# Patient Record
Sex: MALE | Race: WHITE | NOT HISPANIC OR LATINO | Employment: OTHER | URBAN - METROPOLITAN AREA
[De-identification: names, ages, dates, MRNs, and addresses within clinical notes are randomized per-mention and may not be internally consistent; named-entity substitution may affect disease eponyms.]

---

## 2017-02-07 ENCOUNTER — OFFICE VISIT (OUTPATIENT)
Dept: MEDICAL GROUP | Facility: CLINIC | Age: 77
End: 2017-02-07
Payer: MEDICARE

## 2017-02-07 VITALS
RESPIRATION RATE: 18 BRPM | SYSTOLIC BLOOD PRESSURE: 126 MMHG | OXYGEN SATURATION: 96 % | HEART RATE: 71 BPM | WEIGHT: 149 LBS | TEMPERATURE: 99.3 F | BODY MASS INDEX: 24.83 KG/M2 | HEIGHT: 65 IN | DIASTOLIC BLOOD PRESSURE: 80 MMHG

## 2017-02-07 DIAGNOSIS — M54.41 CHRONIC MIDLINE LOW BACK PAIN WITH BILATERAL SCIATICA: ICD-10-CM

## 2017-02-07 DIAGNOSIS — G89.29 CHRONIC MIDLINE LOW BACK PAIN WITH BILATERAL SCIATICA: ICD-10-CM

## 2017-02-07 DIAGNOSIS — M54.42 CHRONIC MIDLINE LOW BACK PAIN WITH BILATERAL SCIATICA: ICD-10-CM

## 2017-02-07 DIAGNOSIS — M54.2 NECK PAIN: ICD-10-CM

## 2017-02-07 PROCEDURE — 4040F PNEUMOC VAC/ADMIN/RCVD: CPT | Performed by: INTERNAL MEDICINE

## 2017-02-07 PROCEDURE — 1101F PT FALLS ASSESS-DOCD LE1/YR: CPT | Performed by: INTERNAL MEDICINE

## 2017-02-07 PROCEDURE — G8420 CALC BMI NORM PARAMETERS: HCPCS | Performed by: INTERNAL MEDICINE

## 2017-02-07 PROCEDURE — G8432 DEP SCR NOT DOC, RNG: HCPCS | Performed by: INTERNAL MEDICINE

## 2017-02-07 PROCEDURE — 99214 OFFICE O/P EST MOD 30 MIN: CPT | Performed by: INTERNAL MEDICINE

## 2017-02-07 PROCEDURE — G8484 FLU IMMUNIZE NO ADMIN: HCPCS | Performed by: INTERNAL MEDICINE

## 2017-02-07 PROCEDURE — 1036F TOBACCO NON-USER: CPT | Performed by: INTERNAL MEDICINE

## 2017-02-07 RX ORDER — OXYCODONE AND ACETAMINOPHEN 10; 325 MG/1; MG/1
1 TABLET ORAL EVERY 6 HOURS PRN
Qty: 90 TAB | Refills: 0 | Status: SHIPPED | OUTPATIENT
Start: 2017-02-07 | End: 2017-02-07 | Stop reason: SDUPTHER

## 2017-02-07 RX ORDER — OXYCODONE AND ACETAMINOPHEN 10; 325 MG/1; MG/1
1 TABLET ORAL EVERY 6 HOURS PRN
Qty: 90 TAB | Refills: 0 | Status: SHIPPED | OUTPATIENT
Start: 2017-02-07 | End: 2017-03-07 | Stop reason: SDUPTHER

## 2017-02-07 ASSESSMENT — PATIENT HEALTH QUESTIONNAIRE - PHQ9: CLINICAL INTERPRETATION OF PHQ2 SCORE: 0

## 2017-02-07 NOTE — MR AVS SNAPSHOT
"Brian Mcduffie   2017 2:00 PM   Office Visit   MRN: [id number]    Department:  [facility]   Dept Phone:  [phone]    Description:  Male : 1940   Provider:  Angel Meraz D.O.           Reason for Visit     Follow-Up 3 months      Allergies as of 2017     Allergen Noted Reactions    Nkda [No Known Drug Allergy] 2008         You were diagnosed with     Chronic midline low back pain with bilateral sciatica   [0384512]       Neck pain   [273059]         Vital Signs     Blood Pressure Pulse Temperature Respirations Height Weight    126/80 mmHg 71 37.4 °C (99.3 °F) 18 1.651 m (5' 5\") 67.586 kg (149 lb)    Body Mass Index Oxygen Saturation Smoking Status             24.79 kg/m2 96% Former Smoker         Basic Information     Date Of Birth Sex Race Ethnicity Preferred Language    1940 Male White Non- English      Problem List              ICD-10-CM Priority Class Noted - Resolved    Neck pain M54.2   5/15/2009 - Present    Gouty arthritis M10.9   2009 - Present    Renal insufficiency N28.9   2009 - Present    Dyslipidemia E78.5   2009 - Present    CHF (congestive heart failure) (CMS-HCC) I50.9   2009 - Present    HYPOTENSION    2009 - Present    HTN (hypertension) I10   2009 - Present    Nocturia R35.1   2009 - Present    Skin texture changes R23.4   2009 - Present    Nocturia R35.1   2010 - Present    Elevated PSA R97.20   7/3/2012 - Present    Inguinal hernia K40.90   10/16/2012 - Present    Colon polyp K63.5   2013 - Present    Aortic calcification (CMS-HCC) I70.0   2013 - Present      Health Maintenance        Date Due Completion Dates    IMM ZOSTER VACCINE 1999 ---    IMM INFLUENZA (1) 2016 10/26/2015, 2014, 2013, 2012, 2010, 2009    COLONOSCOPY 2023    IMM DTaP/Tdap/Td Vaccine (2 - Td) 2023            Current Immunizations     13-VALENT PCV " PREVNAR 7/28/2016    Influenza TIV (IM) 12/16/2013, 11/5/2012, 12/9/2009    Influenza Vaccine Adult HD 10/26/2015    Influenza Vaccine Pediatric 12/6/2010    Influenza Vaccine Quad Inj (Preserved) 12/26/2014    Pneumococcal polysaccharide vaccine (PPSV-23) 12/16/2013    Tdap Vaccine 5/9/2013      Below and/or attached are the medications your provider expects you to take. Review all of your home medications and newly ordered medications with your provider and/or pharmacist. Follow medication instructions as directed by your provider and/or pharmacist. Please keep your medication list with you and share with your provider. Update the information when medications are discontinued, doses are changed, or new medications (including over-the-counter products) are added; and carry medication information at all times in the event of emergency situations     Allergies:  NKDA - (reactions not documented)               Medications  Valid as of: February 07, 2017 -  2:32 PM    Generic Name Brand Name Tablet Size Instructions for use    Allopurinol (Tab) ZYLOPRIM 300 MG TAKE ONE TABLET BY MOUTH DAILY        AmLODIPine Besylate (Tab) NORVASC 2.5 MG TAKE ONE TABLET BY MOUTH DAILY        Hydrocodone-Acetaminophen (Tab) NORCO 5-325 MG Take 1 Tab by mouth every four hours as needed.        Lisinopril-Hydrochlorothiazide (Tab) PRINZIDE, ZESTORETIC 20-12.5 MG TAKE ONE TABLET BY MOUTH DAILY        Lovastatin (Tab) MEVACOR 40 MG TAKE ONE TABLET BY MOUTH DAILY        Ondansetron HCl (Tab) ZOFRAN 4 MG Take 1 Tab by mouth every 8 hours as needed.        Oxycodone-Acetaminophen (Tab) PERCOCET-10  MG Take 1 Tab by mouth every 6 hours as needed.        Tamsulosin HCl (Cap) FLOMAX 0.4 MG Take 1 Cap by mouth ONE-HALF HOUR AFTER BREAKFAST.        Tamsulosin HCl (Cap) FLOMAX 0.4 MG TAKE TWO CAPSULES BY MOUTH DAILY 1/2 AN HOUR AFTER BREAKFAST        .                 Medicines prescribed today were sent to:     [facility] #[id number] -  [place], [place] - [address]    [address] [place] [address]    Phone: [phone] Fax: [phone]    Open 24 Hours?: No      Medication refill instructions:       If your prescription bottle indicates you have medication refills left, it is not necessary to call your provider’s office. Please contact your pharmacy and they will refill your medication.    If your prescription bottle indicates you do not have any refills left, you may request refills at any time through one of the following ways: The online [employer] [address] system (except Urgent Care), by calling your provider’s office, or by asking your pharmacy to contact your provider’s office with a refill request. Medication refills are processed only during regular business hours and may not be available until the next business day. Your provider may request additional information or to have a follow-up visit with you prior to refilling your medication.   *Please Note: Medication refills are assigned a new Rx number when refilled electronically. Your pharmacy may indicate that no refills were authorized even though a new prescription for the same medication is available at the pharmacy. Please request the medicine by name with the pharmacy before contacting your provider for a refill.        Your To Do List     Future Labs/Procedures Complete By Expires    DX-LUMBAR SPINE-2 OR 3 VIEWS  As directed 2/7/2018    DX-LUMBAR SPINE-BEND OR FLEX-EXT  As directed 2/7/2018    MR-LUMBAR SPINE-W/O  As directed 8/10/2017      Referral     A referral request has been sent to our patient care coordination department. Please allow 3-5 business days for us to process this request and contact you either by phone or mail. If you do not hear from us by the 5th business day, please call us at [phone].           [address] Status: Patient Declined

## 2017-02-07 NOTE — PROGRESS NOTES
CC: Brian Mcduffie is a 76 y.o. male is suffering from   Chief Complaint   Patient presents with   • Follow-Up     3 months         SUBJECTIVE:  1. Chronic midline low back pain with bilateral sciatica  Koroma here for follow-up, as undergone evaluation by anesthesiology's had an epidural steroid without any significant improvement in his symptoms. Patient is struggled with his back pain states he's now having difficulty with ambulating more than 100 yards, states that he will not engage in physical activity because of the pain, cannot ride an exercise bike.  We have discussed that his disease status progressed the point where he does need surgical intervention. Patient's repeat x-rays also MRI of lumbar spine.     2. Neck pain  Patient with ongoing neck pain discomfort        Past social, family, history:   Social History   Substance Use Topics   • Smoking status: Former Smoker     Quit date: 10/03/1969   • Smokeless tobacco: Never Used   • Alcohol Use: Yes      Comment: 2 a month         MEDICATIONS:    Current outpatient prescriptions:   •  oxycodone-acetaminophen (PERCOCET-10)  MG Tab, Take 1 Tab by mouth every 6 hours as needed., Disp: 90 Tab, Rfl: 0  •  lovastatin (MEVACOR) 40 MG tablet, TAKE ONE TABLET BY MOUTH DAILY, Disp: 90 Tab, Rfl: 3  •  tamsulosin (FLOMAX) 0.4 MG capsule, Take 1 Cap by mouth ONE-HALF HOUR AFTER BREAKFAST., Disp: 90 Cap, Rfl: 3  •  lisinopril-hydrochlorothiazide (PRINZIDE, ZESTORETIC) 20-12.5 MG per tablet, TAKE ONE TABLET BY MOUTH DAILY, Disp: 90 Tab, Rfl: 3  •  amlodipine (NORVASC) 2.5 MG Tab, TAKE ONE TABLET BY MOUTH DAILY, Disp: 90 Tab, Rfl: 3  •  allopurinol (ZYLOPRIM) 300 MG Tab, TAKE ONE TABLET BY MOUTH DAILY, Disp: 90 Tab, Rfl: 3  •  tamsulosin (FLOMAX) 0.4 MG capsule, TAKE TWO CAPSULES BY MOUTH DAILY 1/2 AN HOUR AFTER BREAKFAST, Disp: 180 Cap, Rfl: 2  •  hydrocodone-acetaminophen (NORCO) 5-325 MG TABS per tablet, Take 1 Tab by mouth every four hours as  "needed., Disp: 8 Tab, Rfl: 0  •  ondansetron (ZOFRAN) 4 MG TABS tablet, Take 1 Tab by mouth every 8 hours as needed., Disp: 5 Tab, Rfl: 1    PROBLEMS:  Patient Active Problem List    Diagnosis Date Noted   • Aortic calcification (CMS-HCC) 05/24/2013   • Colon polyp 02/19/2013   • Inguinal hernia 10/16/2012   • Elevated PSA 07/03/2012   • Nocturia 12/06/2010   • Skin texture changes 12/16/2009   • Nocturia 09/09/2009   • HTN (hypertension) 06/05/2009   • Gouty arthritis 05/22/2009   • Renal insufficiency 05/22/2009   • Dyslipidemia 05/22/2009   • CHF (congestive heart failure) (CMS-HCC) 05/22/2009   • HYPOTENSION 05/22/2009   • Neck pain 05/15/2009       REVIEW OF SYSTEMS:  Gen.:  No Nausea, Vomiting, fever, Chills.  Heart: No chest pain.  Lungs:  No shortness of Breath.  Psychological: Tony unusual Anxiety depression     PHYSICAL EXAM   Constitutional: Alert, cooperative, not in acute distress.  Cardiovascular:  Rate Rhythm is regular without murmurs rubs clicks.     Thorax & Lungs: Clear to auscultation, no wheezing, rhonchi, or rales  HENT: Normocephalic, Atraumatic.  Eyes: PERRLA, EOMI, Conjunctiva normal.   Neck: Trachia is midline no swelling of the thyroid.   Neurologic: Alert & oriented x 3, cranial nerves II through XII are intact, Normal motor function, Normal sensory function, No focal deficits noted.   Psychiatric: Affect normal, Judgment normal, Mood normal.     VITAL SIGNS:/80 mmHg  Pulse 71  Temp(Src) 37.4 °C (99.3 °F)  Resp 18  Ht 1.651 m (5' 5\")  Wt 67.586 kg (149 lb)  BMI 24.79 kg/m2  SpO2 96%    Labs: Reviewed    Assessment:                                                     Plan:    1. Chronic midline low back pain with bilateral sciatica  X-ray MRI lumbar spine referral to neurosurgery for evaluation, patient is failed outpatient epidural steroids  - MR-LUMBAR SPINE-W/O; Future  - REFERRAL TO NEUROSURGERY  - DX-LUMBAR SPINE-BEND OR FLEX-EXT; Future  - DX-LUMBAR SPINE-2 OR 3 VIEWS; " Future    2. Neck pain  Continue Percocet  - oxycodone-acetaminophen (PERCOCET-10)  MG Tab; Take 1 Tab by mouth every 6 hours as needed.  Dispense: 90 Tab; Refill: 0

## 2017-02-14 PROBLEM — Z79.891 CHRONIC USE OF OPIATE DRUGS THERAPEUTIC PURPOSES: Status: ACTIVE | Noted: 2017-02-14

## 2017-02-27 ENCOUNTER — HOSPITAL ENCOUNTER (OUTPATIENT)
Dept: RADIOLOGY | Facility: MEDICAL CENTER | Age: 77
End: 2017-02-27
Attending: INTERNAL MEDICINE
Payer: MEDICARE

## 2017-02-27 DIAGNOSIS — K62.89 CHRONIC IDIOPATHIC ANAL PAIN: ICD-10-CM

## 2017-02-27 DIAGNOSIS — M54.42 LEFT-SIDED LOW BACK PAIN WITH LEFT-SIDED SCIATICA, UNSPECIFIED CHRONICITY: ICD-10-CM

## 2017-02-27 DIAGNOSIS — G89.29 CHRONIC IDIOPATHIC ANAL PAIN: ICD-10-CM

## 2017-02-27 DIAGNOSIS — M54.41 LEFT-SIDED LOW BACK PAIN WITH RIGHT-SIDED SCIATICA, UNSPECIFIED CHRONICITY: ICD-10-CM

## 2017-02-27 PROCEDURE — 72110 X-RAY EXAM L-2 SPINE 4/>VWS: CPT

## 2017-03-07 ENCOUNTER — OFFICE VISIT (OUTPATIENT)
Dept: MEDICAL GROUP | Facility: CLINIC | Age: 77
End: 2017-03-07
Payer: MEDICARE

## 2017-03-07 VITALS
BODY MASS INDEX: 24.66 KG/M2 | HEART RATE: 76 BPM | DIASTOLIC BLOOD PRESSURE: 82 MMHG | RESPIRATION RATE: 16 BRPM | HEIGHT: 65 IN | OXYGEN SATURATION: 96 % | TEMPERATURE: 97.5 F | SYSTOLIC BLOOD PRESSURE: 120 MMHG | WEIGHT: 148 LBS

## 2017-03-07 DIAGNOSIS — Z79.899 CHRONIC USE OF BENZODIAZEPINE FOR THERAPEUTIC PURPOSE: ICD-10-CM

## 2017-03-07 DIAGNOSIS — M54.5 LOW BACK PAIN, UNSPECIFIED BACK PAIN LATERALITY, UNSPECIFIED CHRONICITY, WITH SCIATICA PRESENCE UNSPECIFIED: ICD-10-CM

## 2017-03-07 DIAGNOSIS — Z79.891 CHRONIC USE OF OPIATE DRUGS THERAPEUTIC PURPOSES: ICD-10-CM

## 2017-03-07 PROCEDURE — G8432 DEP SCR NOT DOC, RNG: HCPCS | Performed by: INTERNAL MEDICINE

## 2017-03-07 PROCEDURE — G8420 CALC BMI NORM PARAMETERS: HCPCS | Performed by: INTERNAL MEDICINE

## 2017-03-07 PROCEDURE — 4040F PNEUMOC VAC/ADMIN/RCVD: CPT | Performed by: INTERNAL MEDICINE

## 2017-03-07 PROCEDURE — 1036F TOBACCO NON-USER: CPT | Performed by: INTERNAL MEDICINE

## 2017-03-07 PROCEDURE — 99213 OFFICE O/P EST LOW 20 MIN: CPT | Performed by: INTERNAL MEDICINE

## 2017-03-07 PROCEDURE — G8482 FLU IMMUNIZE ORDER/ADMIN: HCPCS | Performed by: INTERNAL MEDICINE

## 2017-03-07 PROCEDURE — 1101F PT FALLS ASSESS-DOCD LE1/YR: CPT | Performed by: INTERNAL MEDICINE

## 2017-03-07 RX ORDER — OXYCODONE AND ACETAMINOPHEN 10; 325 MG/1; MG/1
1 TABLET ORAL EVERY 6 HOURS PRN
Qty: 90 TAB | Refills: 0 | Status: SHIPPED | OUTPATIENT
Start: 2017-03-07 | End: 2017-03-07 | Stop reason: SDUPTHER

## 2017-03-07 RX ORDER — OXYCODONE AND ACETAMINOPHEN 10; 325 MG/1; MG/1
1 TABLET ORAL EVERY 6 HOURS PRN
Qty: 90 TAB | Refills: 0 | Status: SHIPPED | OUTPATIENT
Start: 2017-03-07 | End: 2017-06-07 | Stop reason: SDUPTHER

## 2017-03-07 ASSESSMENT — LIFESTYLE VARIABLES: HISTORY_ALCOHOL_USE: 0

## 2017-03-07 ASSESSMENT — ENCOUNTER SYMPTOMS: DEPRESSION: 0

## 2017-03-07 ASSESSMENT — PAIN SCALES - GENERAL: PAINLEVEL: 4=SLIGHT-MODERATE PAIN

## 2017-03-07 NOTE — PROGRESS NOTES
CC: Brian Mcduffie is a 76 y.o. male is suffering from   Chief Complaint   Patient presents with   • Back Pain     follow up         SUBJECTIVE:  1. Low back pain, unspecified back pain laterality, unspecified chronicity, with sciatica presence unspecified  Patient's here for follow-up has a history of back pain. We've reviewed his previous MRI of his lumbar spine which shows that he does have central canal stenosis.     2. Chronic use of opiate drugs therapeutic purposes  Patient's using chronic opioids for this issue.     3. Chronic use of benzodiazepine for therapeutic purpose  Stable      Chronic pain recheck:   Last dose of controlled substance: Today  Chronic pain treated with Norco taken 2-3 times a day    He  reports that he drinks alcohol.  He  reports that he does not use illicit drugs.    Consequences of Chronic Opiate therapy:  (5 A's)  Analgesia: Compared to no treatment or prior treatment, pain is currently improved  Activity: improved  Adverse Events: He denies constipation, dry mouth, itchy skin, nausea, sedation and none  Aberrant Behaviors: He reports he is taking medication as prescribed and is not veering from agreed treatment regimen. There have been no inappropriate refills or lost/stolen meds reported.   Affect/Mood: Pain is impacting patient's mood.  Patient denies depression/anxiety.    Nonnarcotic treatments that are being used: none.   Treatment goals discussed.    Last order of CONTROLLED SUBSTANCE TREATMENT AGREEMENT was found on 3/7/2017 from Office Visit on 3/7/2017     UDS Summary                URINE DRUG SCREEN Next Due 7/23/2017      Done 7/28/2016 PAIN MANAGEMENT PANEL, SCRN W/ RFLX TO QNT        Most recent UDS reviewed today and is consistent with prescribed medications.   Opioid Risk Score: 0    Interpretation of Opioid Risk Score   Score 0-3 = Low risk of abuse. Do UDS at least once per year.  Score 4-7 = Moderate risk of abuse. Do UDS 1-4 times per year.  Score 8+ =  High risk of abuse. Refer to specialist.      I have reviewed the medical records, the Prescription Monitoring Program and I have determined that controlled substance treatment is medically indicated.      Past social, family, history:   Social History   Substance Use Topics   • Smoking status: Former Smoker     Quit date: 10/03/1969   • Smokeless tobacco: Never Used   • Alcohol Use: Yes      Comment: 2 a month         MEDICATIONS:    Current outpatient prescriptions:   •  oxycodone-acetaminophen (PERCOCET-10)  MG Tab, Take 1 Tab by mouth every 6 hours as needed., Disp: 90 Tab, Rfl: 0  •  tamsulosin (FLOMAX) 0.4 MG capsule, TAKE TWO CAPSULES BY MOUTH DAILY 1/2 AN HOUR AFTER BREAKFAST, Disp: 180 Cap, Rfl: 2  •  lovastatin (MEVACOR) 40 MG tablet, TAKE ONE TABLET BY MOUTH DAILY, Disp: 90 Tab, Rfl: 3  •  lisinopril-hydrochlorothiazide (PRINZIDE, ZESTORETIC) 20-12.5 MG per tablet, TAKE ONE TABLET BY MOUTH DAILY, Disp: 90 Tab, Rfl: 3  •  allopurinol (ZYLOPRIM) 300 MG Tab, TAKE ONE TABLET BY MOUTH DAILY, Disp: 90 Tab, Rfl: 3  •  tamsulosin (FLOMAX) 0.4 MG capsule, Take 1 Cap by mouth ONE-HALF HOUR AFTER BREAKFAST., Disp: 90 Cap, Rfl: 3  •  amlodipine (NORVASC) 2.5 MG Tab, TAKE ONE TABLET BY MOUTH DAILY, Disp: 90 Tab, Rfl: 3  •  hydrocodone-acetaminophen (NORCO) 5-325 MG TABS per tablet, Take 1 Tab by mouth every four hours as needed., Disp: 8 Tab, Rfl: 0  •  ondansetron (ZOFRAN) 4 MG TABS tablet, Take 1 Tab by mouth every 8 hours as needed., Disp: 5 Tab, Rfl: 1    PROBLEMS:  Patient Active Problem List    Diagnosis Date Noted   • Chronic use of opiate drugs therapeutic purposes 02/14/2017   • Aortic calcification (CMS-HCC) 05/24/2013   • Colon polyp 02/19/2013   • Inguinal hernia 10/16/2012   • Elevated PSA 07/03/2012   • Nocturia 12/06/2010   • Skin texture changes 12/16/2009   • Nocturia 09/09/2009   • HTN (hypertension) 06/05/2009   • Gouty arthritis 05/22/2009   • Renal insufficiency 05/22/2009   • Dyslipidemia  "05/22/2009   • CHF (congestive heart failure) (CMS-[facility]) 05/22/2009   • HYPOTENSION 05/22/2009   • Neck pain 05/15/2009       REVIEW OF SYSTEMS:  Gen.:  No Nausea, Vomiting, fever, Chills.  Heart: No chest pain.  Lungs:  No shortness of Breath.  Psychological: Tony unusual Anxiety depression     PHYSICAL EXAM   Constitutional: Alert, cooperative, not in acute distress.  Cardiovascular:  Rate Rhythm is regular without murmurs rubs clicks.     Thorax & Lungs: Clear to auscultation, no wheezing, rhonchi, or rales  HENT: Normocephalic, Atraumatic.  Eyes: PERRLA, EOMI, Conjunctiva normal.   Neck: Trachia is midline no swelling of the thyroid.   Neurologic: Alert & oriented x 3, cranial nerves II through XII are intact, Normal motor function, Normal sensory function, No focal deficits noted.   Psychiatric: Affect normal, Judgment normal, Mood normal.     VITAL SIGNS:/82 mmHg  Pulse 76  Temp(Src) 36.4 °C (97.5 °F)  Resp 16  Ht 1.651 m (5' 5\")  Wt 67.132 kg (148 lb)  BMI 24.63 kg/m2  SpO2 96%    Labs: Reviewed    Assessment:                                                     Plan:    1. Low back pain, unspecified back pain laterality, unspecified chronicity, with sciatica presence unspecified  Patient's MRI of lumbar spine was reviewed with the patient shows significant stenosis  - oxycodone-acetaminophen (PERCOCET-10)  MG Tab; Take 1 Tab by mouth every 6 hours as needed.  Dispense: 90 Tab; Refill: 0    2. Chronic use of opiate drugs therapeutic purposes  Stable  - Controlled Substance Treatment Agreement      "

## 2017-03-07 NOTE — MR AVS SNAPSHOT
"        Brian Mcduffie   3/7/2017 2:00 PM   Office Visit   MRN: [id number]    Department:  [facility]   Dept Phone:  [phone]    Description:  Male : 1940   Provider:  Angel Meraz D.O.           Reason for Visit     Back Pain follow up      Allergies as of 3/7/2017     Allergen Noted Reactions    Nkda [No Known Drug Allergy] 2008         You were diagnosed with     Neck pain   [408805]       Chronic use of opiate drugs therapeutic purposes   [5262047]       Chronic use of benzodiazepine for therapeutic purpose   [1935341]         Vital Signs     Blood Pressure Pulse Temperature Respirations Height Weight    120/82 mmHg 76 36.4 °C (97.5 °F) 16 1.651 m (5' 5\") 67.132 kg (148 lb)    Body Mass Index Oxygen Saturation Smoking Status             24.63 kg/m2 96% Former Smoker         Basic Information     Date Of Birth Sex Race Ethnicity Preferred Language    1940 Male White Non- English      Your appointments     Mar 14, 2017  8:00 AM   MR SP WO 30 with [facility]   [facility]    [address] [address]   [phone]              Problem List              ICD-10-CM Priority Class Noted - Resolved    Neck pain M54.2   5/15/2009 - Present    Gouty arthritis M10.9   2009 - Present    Renal insufficiency N28.9   2009 - Present    Dyslipidemia E78.5   2009 - Present    CHF (congestive heart failure) (CMS-HCC) I50.9   2009 - Present    HYPOTENSION    2009 - Present    HTN (hypertension) I10   2009 - Present    Nocturia R35.1   2009 - Present    Skin texture changes R23.4   2009 - Present    Nocturia R35.1   2010 - Present    Elevated PSA R97.20   7/3/2012 - Present    Inguinal hernia K40.90   10/16/2012 - Present    Colon polyp K63.5   2013 - Present    Aortic calcification (CMS-HCC) I70.0   2013 - Present    Chronic use of opiate drugs therapeutic purposes Z79.899   2017 - Present      Health " Maintenance        Date Due Completion Dates    IMM ZOSTER VACCINE 8/17/1999 ---    COLONOSCOPY 2/7/2023 2/7/2013    IMM DTaP/Tdap/Td Vaccine (2 - Td) 5/9/2023 5/9/2013            Current Immunizations     13-VALENT PCV PREVNAR 7/28/2016    Influenza TIV (IM) 11/30/2016, 12/16/2013, 11/5/2012, 12/9/2009    Influenza Vaccine Adult HD 10/26/2015    Influenza Vaccine Pediatric 12/6/2010    Influenza Vaccine Quad Inj (Preserved) 12/26/2014    Pneumococcal polysaccharide vaccine (PPSV-23) 12/16/2013    Tdap Vaccine 5/9/2013      Below and/or attached are the medications your provider expects you to take. Review all of your home medications and newly ordered medications with your provider and/or pharmacist. Follow medication instructions as directed by your provider and/or pharmacist. Please keep your medication list with you and share with your provider. Update the information when medications are discontinued, doses are changed, or new medications (including over-the-counter products) are added; and carry medication information at all times in the event of emergency situations     Allergies:  NKDA - (reactions not documented)               Medications  Valid as of: March 07, 2017 -  2:29 PM    Generic Name Brand Name Tablet Size Instructions for use    Allopurinol (Tab) ZYLOPRIM 300 MG TAKE ONE TABLET BY MOUTH DAILY        AmLODIPine Besylate (Tab) NORVASC 2.5 MG TAKE ONE TABLET BY MOUTH DAILY        Hydrocodone-Acetaminophen (Tab) NORCO 5-325 MG Take 1 Tab by mouth every four hours as needed.        Lisinopril-Hydrochlorothiazide (Tab) PRINZIDE, ZESTORETIC 20-12.5 MG TAKE ONE TABLET BY MOUTH DAILY        Lovastatin (Tab) MEVACOR 40 MG TAKE ONE TABLET BY MOUTH DAILY        Ondansetron HCl (Tab) ZOFRAN 4 MG Take 1 Tab by mouth every 8 hours as needed.        Oxycodone-Acetaminophen (Tab) PERCOCET-10  MG Take 1 Tab by mouth every 6 hours as needed.        Tamsulosin HCl (Cap) FLOMAX 0.4 MG Take 1 Cap by mouth  ONE-HALF HOUR AFTER BREAKFAST.        Tamsulosin HCl (Cap) FLOMAX 0.4 MG TAKE TWO CAPSULES BY MOUTH DAILY 1/2 AN HOUR AFTER BREAKFAST        .                 Medicines prescribed today were sent to:     [facility] #[id number] - [address] AT [address] [address]    Phone: [phone] Fax: [phone]    Open 24 Hours?: No      Medication refill instructions:       If your prescription bottle indicates you have medication refills left, it is not necessary to call your provider’s office. Please contact your pharmacy and they will refill your medication.    If your prescription bottle indicates you do not have any refills left, you may request refills at any time through one of the following ways: The online [employer] system (except Urgent Care), by calling your provider’s office, or by asking your pharmacy to contact your provider’s office with a refill request. Medication refills are processed only during regular business hours and may not be available until the next business day. Your provider may request additional information or to have a follow-up visit with you prior to refilling your medication.   *Please Note: Medication refills are assigned a new Rx number when refilled electronically. Your pharmacy may indicate that no refills were authorized even though a new prescription for the same medication is available at the pharmacy. Please request the medicine by name with the pharmacy before contacting your provider for a refill.           [facility] Status: Patient Declined

## 2017-03-14 ENCOUNTER — HOSPITAL ENCOUNTER (OUTPATIENT)
Dept: RADIOLOGY | Facility: MEDICAL CENTER | Age: 77
End: 2017-03-14
Attending: INTERNAL MEDICINE
Payer: MEDICARE

## 2017-03-14 DIAGNOSIS — M54.41 CHRONIC MIDLINE LOW BACK PAIN WITH BILATERAL SCIATICA: ICD-10-CM

## 2017-03-14 DIAGNOSIS — M54.42 CHRONIC MIDLINE LOW BACK PAIN WITH BILATERAL SCIATICA: ICD-10-CM

## 2017-03-14 DIAGNOSIS — G89.29 CHRONIC MIDLINE LOW BACK PAIN WITH BILATERAL SCIATICA: ICD-10-CM

## 2017-03-14 PROCEDURE — 72148 MRI LUMBAR SPINE W/O DYE: CPT

## 2017-05-08 ENCOUNTER — OFFICE VISIT (OUTPATIENT)
Dept: MEDICAL GROUP | Facility: CLINIC | Age: 77
End: 2017-05-08
Payer: MEDICARE

## 2017-05-08 VITALS
TEMPERATURE: 97 F | WEIGHT: 149 LBS | RESPIRATION RATE: 16 BRPM | HEART RATE: 88 BPM | DIASTOLIC BLOOD PRESSURE: 80 MMHG | OXYGEN SATURATION: 97 % | SYSTOLIC BLOOD PRESSURE: 140 MMHG | BODY MASS INDEX: 24.83 KG/M2 | HEIGHT: 65 IN

## 2017-05-08 DIAGNOSIS — H10.33 ACUTE BACTERIAL CONJUNCTIVITIS OF BOTH EYES: ICD-10-CM

## 2017-05-08 PROCEDURE — 99213 OFFICE O/P EST LOW 20 MIN: CPT | Performed by: NURSE PRACTITIONER

## 2017-05-08 PROCEDURE — 4040F PNEUMOC VAC/ADMIN/RCVD: CPT | Performed by: NURSE PRACTITIONER

## 2017-05-08 PROCEDURE — G8420 CALC BMI NORM PARAMETERS: HCPCS | Performed by: NURSE PRACTITIONER

## 2017-05-08 PROCEDURE — 1101F PT FALLS ASSESS-DOCD LE1/YR: CPT | Performed by: NURSE PRACTITIONER

## 2017-05-08 PROCEDURE — 1036F TOBACCO NON-USER: CPT | Performed by: NURSE PRACTITIONER

## 2017-05-08 PROCEDURE — G8432 DEP SCR NOT DOC, RNG: HCPCS | Performed by: NURSE PRACTITIONER

## 2017-05-08 RX ORDER — POLYMYXIN B SULFATE AND TRIMETHOPRIM 1; 10000 MG/ML; [USP'U]/ML
1 SOLUTION OPHTHALMIC EVERY 4 HOURS
Qty: 10 ML | Refills: 0 | Status: SHIPPED | OUTPATIENT
Start: 2017-05-08 | End: 2017-05-13

## 2017-05-08 ASSESSMENT — ENCOUNTER SYMPTOMS
CHILLS: 0
COUGH: 0
SORE THROAT: 0
EYE PAIN: 0
FEVER: 0
EYE REDNESS: 1
EYE DISCHARGE: 1

## 2017-05-08 NOTE — PATIENT INSTRUCTIONS
Bacterial Conjunctivitis  Bacterial conjunctivitis (commonly called pink eye) is redness, soreness, or puffiness (inflammation) of the white part of your eye. It is caused by a germ called bacteria. These germs can easily spread from person to person (contagious). Your eye often will become red or pink. Your eye may also become irritated, watery, or have a thick discharge.   HOME CARE   · Apply a cool, clean washcloth over closed eyelids. Do this for 10-20 minutes, 3-4 times a day while you have pain.  · Gently wipe away any fluid coming from the eye with a warm, wet washcloth or cotton ball.  · Wash your hands often with soap and water. Use paper towels to dry your hands.  · Do not share towels or washcloths.  · Change or wash your pillowcase every day.  · Do not use eye makeup until the infection is gone.  · Do not use machines or drive if your vision is blurry.  · Stop using contact lenses. Do not use them again until your doctor says it is okay.  · Do not touch the tip of the eye drop bottle or medicine tube with your fingers when you put medicine on the eye.  GET HELP RIGHT AWAY IF:   · Your eye is not better after 3 days of starting your medicine.  · You have a yellowish fluid coming out of the eye.  · You have more pain in the eye.  · Your eye redness is spreading.  · Your vision becomes blurry.  · You have a fever or lasting symptoms for more than 2-3 days.  · You have a fever and your symptoms suddenly get worse.  · You have pain in the face.  · Your face gets red or puffy (swollen).  MAKE SURE YOU:   · Understand these instructions.  · Will watch this condition.  · Will get help right away if you are not doing well or get worse.     This information is not intended to replace advice given to you by your health care provider. Make sure you discuss any questions you have with your health care provider.     Document Released: 09/26/2009 Document Revised: 12/04/2013 Document Reviewed: 08/23/2013  [employer] Patient Education ©2016 [employer]

## 2017-05-08 NOTE — PROGRESS NOTES
Chief Complaint   Patient presents with   • Eye Problem     redness/ itchiness/       HISTORY OF PRESENT ILLNESS: Patient is a 76 y.o. male established patient who presents today due to 2 days hx of red eye, itchiness and also yellowish discharge. Pt denied any fever or chills. No vision change. No recent URI. Pt wears glasses not contact lens. No recent hospitalization. He uses warm towel compression but not helping and symptoms have gone worse today. He called her daughter who recommended pt to be seen immediately.       Patient Active Problem List    Diagnosis Date Noted   • Chronic use of opiate drugs therapeutic purposes 02/14/2017   • Aortic calcification (CMS-HCC) 05/24/2013   • Colon polyp 02/19/2013   • Inguinal hernia 10/16/2012   • Elevated PSA 07/03/2012   • Nocturia 12/06/2010   • Skin texture changes 12/16/2009   • Nocturia 09/09/2009   • HTN (hypertension) 06/05/2009   • Gouty arthritis 05/22/2009   • Renal insufficiency 05/22/2009   • Dyslipidemia 05/22/2009   • CHF (congestive heart failure) (CMS-HCC) 05/22/2009   • HYPOTENSION 05/22/2009   • Neck pain 05/15/2009       Allergies:Nkda    Current Outpatient Prescriptions   Medication Sig Dispense Refill   • oxycodone-acetaminophen (PERCOCET-10)  MG Tab Take 1 Tab by mouth every 6 hours as needed. 90 Tab 0   • tamsulosin (FLOMAX) 0.4 MG capsule TAKE TWO CAPSULES BY MOUTH DAILY 1/2 AN HOUR AFTER BREAKFAST 180 Cap 2   • lovastatin (MEVACOR) 40 MG tablet TAKE ONE TABLET BY MOUTH DAILY 90 Tab 3   • amlodipine (NORVASC) 2.5 MG Tab TAKE ONE TABLET BY MOUTH DAILY 90 Tab 3   • allopurinol (ZYLOPRIM) 300 MG Tab TAKE ONE TABLET BY MOUTH DAILY 90 Tab 3   • tamsulosin (FLOMAX) 0.4 MG capsule Take 1 Cap by mouth ONE-HALF HOUR AFTER BREAKFAST. 90 Cap 3   • lisinopril-hydrochlorothiazide (PRINZIDE, ZESTORETIC) 20-12.5 MG per tablet TAKE ONE TABLET BY MOUTH DAILY 90 Tab 3   • hydrocodone-acetaminophen (NORCO) 5-325 MG TABS per tablet Take 1 Tab by mouth every  "four hours as needed. 8 Tab 0   • ondansetron (ZOFRAN) 4 MG TABS tablet Take 1 Tab by mouth every 8 hours as needed. 5 Tab 1     No current facility-administered medications for this visit.       Social History   Substance Use Topics   • Smoking status: Former Smoker     Quit date: 10/03/1969   • Smokeless tobacco: Never Used   • Alcohol Use: Yes      Comment: 2 a month       No family status information on file.   No family history on file.      ROS:  Review of Systems   Constitutional: Negative for fever and chills.   HENT: Negative for congestion, ear pain and sore throat.    Eyes: Positive for discharge and redness. Negative for pain.   Respiratory: Negative for cough.    Skin: Positive for itching (to his both eyes).        Objective:     Blood pressure 140/80, pulse 88, temperature 36.1 °C (97 °F), resp. rate 16, height 1.651 m (5' 5\"), weight 67.586 kg (149 lb), SpO2 97 %.     Physical Exam:  Physical Exam   Constitutional: He is oriented to person, place, and time and well-developed, well-nourished, and in no distress.   HENT:   Head: Normocephalic and atraumatic.   Eyes: Right eye exhibits discharge ( yellow). Left eye exhibits discharge. Right conjunctiva is injected. Left conjunctiva is injected.   Neck: Neck supple. No JVD present.   Cardiovascular: Normal rate.    Pulmonary/Chest: Effort normal.   Musculoskeletal: Normal range of motion. He exhibits no edema.   Neurological: He is alert and oriented to person, place, and time.   Skin: Skin is warm.   Vitals reviewed.        Assessment/Plan:  1. Acute bacterial conjunctivitis of both eyes  - polymixin-trimethoprim (POLYTRIM) 81985-3.1 UNIT/ML-% Solution; Place 1 Drop in both eyes every 4 hours for 5 days.  Dispense: 10 mL; Refill: 0  - Reading material of conjunctivitis was given to pt. Pt is instructed to call if worsening symptoms even after eye drop. Pt verbalized understanding.       "

## 2017-05-08 NOTE — MR AVS SNAPSHOT
"Brian Mcduffie   2017 3:20 PM   Office Visit   MRN: [id number]    Department:  [facility]   Dept Phone:  [phone]    Description:  Male : 1940   Provider:  MUSTAPHA Rob           Reason for Visit     Eye Problem redness/ itchiness/      Allergies as of 2017     Allergen Noted Reactions    Nkda [No Known Drug Allergy] 2008         You were diagnosed with     Acute bacterial conjunctivitis of both eyes   [[id number]]         Vital Signs     Blood Pressure Pulse Temperature Respirations Height Weight    140/80 mmHg 88 36.1 °C (97 °F) 16 1.651 m (5' 5\") 67.586 kg (149 lb)    Body Mass Index Oxygen Saturation Smoking Status             24.79 kg/m2 97% Former Smoker         Basic Information     Date Of Birth Sex Race Ethnicity Preferred Language    1940 Male White Non- English      Your appointments     2017  1:00 PM   Established Patient with Angel Meraz D.O.   [address] [address] [phone]           You will be receiving a confirmation call a few days before your appointment from our automated call confirmation system.              Problem List              ICD-10-CM Priority Class Noted - Resolved    Neck pain M54.2   5/15/2009 - Present    Gouty arthritis M10.9   2009 - Present    Renal insufficiency N28.9   2009 - Present    Dyslipidemia E78.5   2009 - Present    CHF (congestive heart failure) (CMS-HCC) I50.9   2009 - Present    HYPOTENSION    2009 - Present    HTN (hypertension) I10   2009 - Present    Nocturia R35.1   2009 - Present    Skin texture changes R23.4   2009 - Present    Nocturia R35.1   2010 - Present    Elevated PSA R97.20   7/3/2012 - Present    Inguinal hernia K40.90   10/16/2012 - Present    Colon polyp K63.5   2013 - Present    Aortic calcification (CMS-HCC) I70.0   2013 - Present    Chronic use " of opiate drugs therapeutic purposes Z79.891   2/14/2017 - Present      Health Maintenance        Date Due Completion Dates    IMM ZOSTER VACCINE 8/17/1999 ---    COLONOSCOPY 2/7/2023 2/7/2013    IMM DTaP/Tdap/Td Vaccine (2 - Td) 5/9/2023 5/9/2013            Current Immunizations     13-VALENT PCV PREVNAR 7/28/2016    Influenza TIV (IM) 11/30/2016, 12/16/2013, 11/5/2012, 12/9/2009    Influenza Vaccine Adult HD 10/26/2015    Influenza Vaccine Pediatric 12/6/2010    Influenza Vaccine Quad Inj (Preserved) 12/26/2014    Pneumococcal polysaccharide vaccine (PPSV-23) 12/16/2013    Tdap Vaccine 5/9/2013      Below and/or attached are the medications your provider expects you to take. Review all of your home medications and newly ordered medications with your provider and/or pharmacist. Follow medication instructions as directed by your provider and/or pharmacist. Please keep your medication list with you and share with your provider. Update the information when medications are discontinued, doses are changed, or new medications (including over-the-counter products) are added; and carry medication information at all times in the event of emergency situations     Allergies:  NKDA - (reactions not documented)               Medications  Valid as of: May 08, 2017 -  4:38 PM    Generic Name Brand Name Tablet Size Instructions for use    Allopurinol (Tab) ZYLOPRIM 300 MG TAKE ONE TABLET BY MOUTH DAILY        AmLODIPine Besylate (Tab) NORVASC 2.5 MG TAKE ONE TABLET BY MOUTH DAILY        Hydrocodone-Acetaminophen (Tab) NORCO 5-325 MG Take 1 Tab by mouth every four hours as needed.        Lisinopril-Hydrochlorothiazide (Tab) PRINZIDE, ZESTORETIC 20-12.5 MG TAKE ONE TABLET BY MOUTH DAILY        Lovastatin (Tab) MEVACOR 40 MG TAKE ONE TABLET BY MOUTH DAILY        Ondansetron HCl (Tab) ZOFRAN 4 MG Take 1 Tab by mouth every 8 hours as needed.        Oxycodone-Acetaminophen (Tab) PERCOCET-10  MG Take 1 Tab by mouth every 6 hours as  needed.        Polymyxin B-Trimethoprim (Solution) POLYTRIM 03245-4.1 UNIT/ML-% Place 1 Drop in both eyes every 4 hours for 5 days.        Tamsulosin HCl (Cap) FLOMAX 0.4 MG Take 1 Cap by mouth ONE-HALF HOUR AFTER BREAKFAST.        Tamsulosin HCl (Cap) FLOMAX 0.4 MG TAKE TWO CAPSULES BY MOUTH DAILY 1/2 AN HOUR AFTER BREAKFAST        .                 Medicines prescribed today were sent to:     [facility] #[id number] - [address] AT [address] [address]    Phone: [phone] Fax: [phone]    Open 24 Hours?: No      Medication refill instructions:       If your prescription bottle indicates you have medication refills left, it is not necessary to call your provider’s office. Please contact your pharmacy and they will refill your medication.    If your prescription bottle indicates you do not have any refills left, you may request refills at any time through one of the following ways: The online [employer] system (except Urgent Care), by calling your provider’s office, or by asking your pharmacy to contact your provider’s office with a refill request. Medication refills are processed only during regular business hours and may not be available until the next business day. Your provider may request additional information or to have a follow-up visit with you prior to refilling your medication.   *Please Note: Medication refills are assigned a new Rx number when refilled electronically. Your pharmacy may indicate that no refills were authorized even though a new prescription for the same medication is available at the pharmacy. Please request the medicine by name with the pharmacy before contacting your provider for a refill.        Instructions    Bacterial Conjunctivitis  Bacterial conjunctivitis (commonly called pink eye) is redness, soreness, or puffiness (inflammation) of the white part of your eye. It is caused by a germ called bacteria. These germs can easily spread from  person to person (contagious). Your eye often will become red or pink. Your eye may also become irritated, watery, or have a thick discharge.   HOME CARE   · Apply a cool, clean washcloth over closed eyelids. Do this for 10-20 minutes, 3-4 times a day while you have pain.  · Gently wipe away any fluid coming from the eye with a warm, wet washcloth or cotton ball.  · Wash your hands often with soap and water. Use paper towels to dry your hands.  · Do not share towels or washcloths.  · Change or wash your pillowcase every day.  · Do not use eye makeup until the infection is gone.  · Do not use machines or drive if your vision is blurry.  · Stop using contact lenses. Do not use them again until your doctor says it is okay.  · Do not touch the tip of the eye drop bottle or medicine tube with your fingers when you put medicine on the eye.  GET HELP RIGHT AWAY IF:   · Your eye is not better after 3 days of starting your medicine.  · You have a yellowish fluid coming out of the eye.  · You have more pain in the eye.  · Your eye redness is spreading.  · Your vision becomes blurry.  · You have a fever or lasting symptoms for more than 2-3 days.  · You have a fever and your symptoms suddenly get worse.  · You have pain in the face.  · Your face gets red or puffy (swollen).  MAKE SURE YOU:   · Understand these instructions.  · Will watch this condition.  · Will get help right away if you are not doing well or get worse.     This information is not intended to replace advice given to you by your health care provider. Make sure you discuss any questions you have with your health care provider.     Document Released: 09/26/2009 Document Revised: 12/04/2013 Document Reviewed: 08/23/2013  [employer] Interactive Patient Education ©2016 [employer]            MyChart Status: Patient Declined

## 2017-06-07 ENCOUNTER — OFFICE VISIT (OUTPATIENT)
Dept: MEDICAL GROUP | Facility: CLINIC | Age: 77
End: 2017-06-07
Payer: MEDICARE

## 2017-06-07 VITALS
BODY MASS INDEX: 22.22 KG/M2 | SYSTOLIC BLOOD PRESSURE: 152 MMHG | RESPIRATION RATE: 18 BRPM | DIASTOLIC BLOOD PRESSURE: 70 MMHG | TEMPERATURE: 98.7 F | HEIGHT: 68 IN | WEIGHT: 146.6 LBS | HEART RATE: 68 BPM | OXYGEN SATURATION: 97 %

## 2017-06-07 DIAGNOSIS — M54.5 LOW BACK PAIN, UNSPECIFIED BACK PAIN LATERALITY, UNSPECIFIED CHRONICITY, WITH SCIATICA PRESENCE UNSPECIFIED: ICD-10-CM

## 2017-06-07 DIAGNOSIS — R63.4 WEIGHT LOSS: ICD-10-CM

## 2017-06-07 PROCEDURE — 1036F TOBACCO NON-USER: CPT | Performed by: INTERNAL MEDICINE

## 2017-06-07 PROCEDURE — 4040F PNEUMOC VAC/ADMIN/RCVD: CPT | Performed by: INTERNAL MEDICINE

## 2017-06-07 PROCEDURE — G8420 CALC BMI NORM PARAMETERS: HCPCS | Performed by: INTERNAL MEDICINE

## 2017-06-07 PROCEDURE — 99213 OFFICE O/P EST LOW 20 MIN: CPT | Performed by: INTERNAL MEDICINE

## 2017-06-07 PROCEDURE — G8432 DEP SCR NOT DOC, RNG: HCPCS | Performed by: INTERNAL MEDICINE

## 2017-06-07 PROCEDURE — 1101F PT FALLS ASSESS-DOCD LE1/YR: CPT | Performed by: INTERNAL MEDICINE

## 2017-06-07 RX ORDER — OXYCODONE AND ACETAMINOPHEN 10; 325 MG/1; MG/1
1 TABLET ORAL EVERY 6 HOURS PRN
Qty: 90 TAB | Refills: 0 | Status: SHIPPED | OUTPATIENT
Start: 2017-06-07 | End: 2017-09-07 | Stop reason: SDUPTHER

## 2017-06-07 RX ORDER — OXYCODONE AND ACETAMINOPHEN 10; 325 MG/1; MG/1
1 TABLET ORAL EVERY 6 HOURS PRN
Qty: 90 TAB | Refills: 0 | Status: SHIPPED | OUTPATIENT
Start: 2017-06-07 | End: 2017-06-07 | Stop reason: SDUPTHER

## 2017-06-07 NOTE — PROGRESS NOTES
CC: Brian Mcduffie is a 76 y.o. male is suffering from   Chief Complaint   Patient presents with   • Follow-Up         SUBJECTIVE:  1. Low back pain, unspecified back pain laterality, unspecified chronicity, with sciatica presence unspecified  Ga here for follow-up, continues on chronic pain medication warned about side effects associated with use of Percocet.     2. Weight loss  Patient states he's had problems with weight loss labs been ordered denies any nausea vomiting fever chills, has previously undergone colonoscopy was unremarkable.     Chronic pain recheck:   Last dose of controlled substance: Today  Chronic pain treated with Percocet taken 3 times a day    He  reports that he drinks alcohol.  He  reports that he does not use illicit drugs.    Consequences of Chronic Opiate therapy:  (5 A's)  Analgesia: Compared to no treatment or prior treatment, pain is currently improved  Activity: improved  Adverse Events: He denies constipation, dry mouth, itchy skin, nausea, sedation and none  Aberrant Behaviors: He reports he is taking medication as prescribed and is not veering from agreed treatment regimen. There have been no inappropriate refills or lost/stolen meds reported.   Affect/Mood: Pain is not impacting patient's mood.  Patient denies depression/anxiety.    Nonnarcotic treatments that are being used: none.   Treatment goals discussed.    Last order of CONTROLLED SUBSTANCE TREATMENT AGREEMENT was found on 3/7/2017 from Office Visit on 3/7/2017     UDS Summary                URINE DRUG SCREEN Next Due 7/23/2017      Done 7/28/2016 PAIN MANAGEMENT PANEL, SCRN W/ RFLX TO QNT        Most recent UDS reviewed today and is consistent with prescribed medications.   Opioid Risk Score: 0    Interpretation of Opioid Risk Score   Score 0-3 = Low risk of abuse. Do UDS at least once per year.  Score 4-7 = Moderate risk of abuse. Do UDS 1-4 times per year.  Score 8+ = High risk of abuse. Refer to  specialist.      I have reviewed the medical records, the Prescription Monitoring Program and I have determined that controlled substance treatment is medically indicated.      Past social, family, history:   Social History   Substance Use Topics   • Smoking status: Former Smoker     Quit date: 10/03/1969   • Smokeless tobacco: Never Used   • Alcohol Use: Yes      Comment: 2 a month         MEDICATIONS:    Current outpatient prescriptions:   •  oxycodone-acetaminophen (PERCOCET-10)  MG Tab, Take 1 Tab by mouth every 6 hours as needed., Disp: 90 Tab, Rfl: 0  •  lovastatin (MEVACOR) 40 MG tablet, TAKE ONE TABLET BY MOUTH DAILY, Disp: 90 Tab, Rfl: 3  •  tamsulosin (FLOMAX) 0.4 MG capsule, Take 1 Cap by mouth ONE-HALF HOUR AFTER BREAKFAST., Disp: 90 Cap, Rfl: 3  •  lisinopril-hydrochlorothiazide (PRINZIDE, ZESTORETIC) 20-12.5 MG per tablet, TAKE ONE TABLET BY MOUTH DAILY, Disp: 90 Tab, Rfl: 3  •  amlodipine (NORVASC) 2.5 MG Tab, TAKE ONE TABLET BY MOUTH DAILY, Disp: 90 Tab, Rfl: 3  •  allopurinol (ZYLOPRIM) 300 MG Tab, TAKE ONE TABLET BY MOUTH DAILY, Disp: 90 Tab, Rfl: 3  •  tamsulosin (FLOMAX) 0.4 MG capsule, TAKE TWO CAPSULES BY MOUTH DAILY 1/2 AN HOUR AFTER BREAKFAST, Disp: 180 Cap, Rfl: 2  •  ondansetron (ZOFRAN) 4 MG TABS tablet, Take 1 Tab by mouth every 8 hours as needed., Disp: 5 Tab, Rfl: 1    PROBLEMS:  Patient Active Problem List    Diagnosis Date Noted   • Chronic use of opiate drugs therapeutic purposes 02/14/2017   • Aortic calcification (CMS-HCC) 05/24/2013   • Colon polyp 02/19/2013   • Inguinal hernia 10/16/2012   • Elevated PSA 07/03/2012   • Nocturia 12/06/2010   • Skin texture changes 12/16/2009   • Nocturia 09/09/2009   • HTN (hypertension) 06/05/2009   • Gouty arthritis 05/22/2009   • Renal insufficiency 05/22/2009   • Dyslipidemia 05/22/2009   • CHF (congestive heart failure) (CMS-HCC) 05/22/2009   • HYPOTENSION 05/22/2009   • Neck pain 05/15/2009       REVIEW OF SYSTEMS:  Gen.:  No Nausea,  "Vomiting, fever, Chills.  Heart: No chest pain.  Lungs:  No shortness of Breath.  Psychological: Tony unusual Anxiety depression     PHYSICAL EXAM   Constitutional: Alert, cooperative, not in acute distress.  Cardiovascular:  Rate Rhythm is regular without murmurs rubs clicks.     Thorax & Lungs: Clear to auscultation, no wheezing, rhonchi, or rales  HENT: Normocephalic, Atraumatic.  Eyes: PERRLA, EOMI, Conjunctiva normal.   Neck: Trachia is midline no swelling of the thyroid.   Lymphatic: No lymphadenopathy noted.   Neurologic: Alert & oriented x 3, cranial nerves II through XII are intact, Normal motor function, Normal sensory function, No focal deficits noted.   Psychiatric: Affect normal, Judgment normal, Mood normal.     VITAL SIGNS:/70 mmHg  Pulse 68  Temp(Src) 37.1 °C (98.7 °F)  Resp 18  Ht 1.727 m (5' 8\")  Wt 66.497 kg (146 lb 9.6 oz)  BMI 22.30 kg/m2  SpO2 97%    Labs: Reviewed    Assessment:                                                     Plan:    1. Low back pain, unspecified back pain laterality, unspecified chronicity, with sciatica presence unspecified  Continue Percocet warned about side effects including dependence  - [facility] PAIN MANAGEMENT SCREEN; Future  - oxycodone-acetaminophen (PERCOCET-10)  MG Tab; Take 1 Tab by mouth every 6 hours as needed.  Dispense: 90 Tab; Refill: 0    2. Weight loss  Recheck labs, weight was reviewed and appears be relatively stable over the past 3-4 months  - COMP METABOLIC PANEL; Future  - CBC WITH DIFFERENTIAL; Future  - TSH; Future  - FREE THYROXINE; Future          "

## 2017-06-07 NOTE — MR AVS SNAPSHOT
"        Brian Mcduffie   2017 1:00 PM   Office Visit   MRN: [id number]    Department:  [facility]   Dept Phone:  [phone]    Description:  Male : 1940   Provider:  Angel Meraz D.O.           Reason for Visit     Follow-Up           Allergies as of 2017     Allergen Noted Reactions    Nkda [No Known Drug Allergy] 2008         You were diagnosed with     Low back pain, unspecified back pain laterality, unspecified chronicity, with sciatica presence unspecified   [[id number]]       Weight loss   [542386]         Vital Signs     Blood Pressure Pulse Temperature Respirations Height Weight    152/70 mmHg 68 37.1 °C (98.7 °F) 18 1.727 m (5' 8\") 66.497 kg (146 lb 9.6 oz)    Body Mass Index Oxygen Saturation Smoking Status             22.30 kg/m2 97% Former Smoker         Basic Information     Date Of Birth Sex Race Ethnicity Preferred Language    1940 Male White Non- English      Your appointments     Sep 07, 2017  1:00 PM   Established Patient with Angel Meraz D.O.   [address]  [facility] [address]   [phone]           You will be receiving a confirmation call a few days before your appointment from our automated call confirmation system.              Problem List              ICD-10-CM Priority Class Noted - Resolved    Neck pain M54.2   5/15/2009 - Present    Gouty arthritis M10.9   2009 - Present    Renal insufficiency N28.9   2009 - Present    Dyslipidemia E78.5   2009 - Present    CHF (congestive heart failure) (CMS-HCC) I50.9   2009 - Present    HYPOTENSION    2009 - Present    HTN (hypertension) I10   2009 - Present    Nocturia R35.1   2009 - Present    Skin texture changes R23.4   2009 - Present    Nocturia R35.1   2010 - Present    Elevated PSA R97.20   7/3/2012 - Present    Inguinal hernia K40.90   10/16/2012 - Present    Colon polyp K63.5   2013 - " Present    Aortic calcification (CMS-HCC) I70.0   5/24/2013 - Present    Chronic use of opiate drugs therapeutic purposes Z79.891   2/14/2017 - Present      Health Maintenance        Date Due Completion Dates    IMM ZOSTER VACCINE 8/17/1999 ---    COLONOSCOPY 2/7/2023 2/7/2013    IMM DTaP/Tdap/Td Vaccine (2 - Td) 5/9/2023 5/9/2013            Current Immunizations     13-VALENT PCV PREVNAR 7/28/2016    Influenza TIV (IM) 11/30/2016, 12/16/2013, 11/5/2012, 12/9/2009    Influenza Vaccine Adult HD 10/26/2015    Influenza Vaccine Pediatric 12/6/2010    Influenza Vaccine Quad Inj (Preserved) 12/26/2014    Pneumococcal polysaccharide vaccine (PPSV-23) 12/16/2013    Tdap Vaccine 5/9/2013      Below and/or attached are the medications your provider expects you to take. Review all of your home medications and newly ordered medications with your provider and/or pharmacist. Follow medication instructions as directed by your provider and/or pharmacist. Please keep your medication list with you and share with your provider. Update the information when medications are discontinued, doses are changed, or new medications (including over-the-counter products) are added; and carry medication information at all times in the event of emergency situations     Allergies:  NKDA - (reactions not documented)               Medications  Valid as of: June 07, 2017 -  1:27 PM    Generic Name Brand Name Tablet Size Instructions for use    Allopurinol (Tab) ZYLOPRIM 300 MG TAKE ONE TABLET BY MOUTH DAILY        AmLODIPine Besylate (Tab) NORVASC 2.5 MG TAKE ONE TABLET BY MOUTH DAILY        Lisinopril-Hydrochlorothiazide (Tab) PRINZIDE, ZESTORETIC 20-12.5 MG TAKE ONE TABLET BY MOUTH DAILY        Lovastatin (Tab) MEVACOR 40 MG TAKE ONE TABLET BY MOUTH DAILY        Ondansetron HCl (Tab) ZOFRAN 4 MG Take 1 Tab by mouth every 8 hours as needed.        Oxycodone-Acetaminophen (Tab) PERCOCET-10  MG Take 1 Tab by mouth every 6 hours as needed.         Tamsulosin HCl (Cap) FLOMAX 0.4 MG Take 1 Cap by mouth ONE-HALF HOUR AFTER BREAKFAST.        Tamsulosin HCl (Cap) FLOMAX 0.4 MG TAKE TWO CAPSULES BY MOUTH DAILY 1/2 AN HOUR AFTER BREAKFAST        .                 [facility]icines prescribed [id number] [place] [place] - [address] [place] [address] AT [address] [address]    Phone: [phone] Fax: [phone]    Open 24 Hours?: No      Medication refill instructions:       If your prescription bottle indicates you have medication refills left, it is not necessary to call your provider’s office. Please contact your pharmacy and they will refill your medication.    If your prescription bottle indicates you do not have any refills left, you may request refills at any time through one of the following ways: The online [employer] system (except Urgent Care), by calling your provider’s office, or by asking your pharmacy to contact your provider’s office with a refill request. Medication refills are processed only during regular business hours and may not be available until the next business day. Your provider may request additional information or to have a follow-up visit with you prior to refilling your medication.   *Please Note: Medication refills are assigned a new Rx number when refilled electronically. Your pharmacy may indicate that no refills were authorized even though a new prescription for the same medication is available at the pharmacy. Please request the medicine by name with the pharmacy before contacting your provider for a refill.        Your To Do List     Future Labs/Procedures Complete By Expires    CBC WITH DIFFERENTIAL  As directed 6/8/2018    COMP METABOLIC PANEL  As directed 6/8/2018    FREE THYROXINE  As directed 6/8/2018    [facility]  As directed 6/7/2018    Comments:    Current Meds (name, sig, last dose):   Current outpatient prescriptions:   •  oxycodone-acetaminophen, 1 Tab, Oral,  Q6HRS PRN  •  lovastatin, TAKE ONE TABLET BY MOUTH DAILY  •  tamsulosin, 0.4 mg, Oral, AFTER BREAKFAST  •  lisinopril-hydrochlorothiazide, TAKE ONE TABLET BY MOUTH DAILY  •  amlodipine, TAKE ONE TABLET BY MOUTH DAILY  •  allopurinol, TAKE ONE TABLET BY MOUTH DAILY  •  tamsulosin, TAKE TWO CAPSULES BY MOUTH DAILY 1/2 AN HOUR AFTER BREAKFAST  •  hydrocodone-acetaminophen, 1 Tab, Oral, Q4HRS PRN, 7/28/2016  •  ondansetron, 4 mg, Oral, Q8HRS PRN          TSH  As directed 6/8/2018         MyChart Status: Patient Declined

## 2017-06-27 RX ORDER — ALLOPURINOL 300 MG/1
TABLET ORAL
Qty: 90 TAB | Refills: 3 | Status: SHIPPED | OUTPATIENT
Start: 2017-06-27 | End: 2018-06-23 | Stop reason: SDUPTHER

## 2017-06-27 RX ORDER — AMLODIPINE BESYLATE 2.5 MG/1
TABLET ORAL
Qty: 90 TAB | Refills: 3 | Status: SHIPPED | OUTPATIENT
Start: 2017-06-27 | End: 2018-06-23 | Stop reason: SDUPTHER

## 2017-07-12 RX ORDER — LISINOPRIL AND HYDROCHLOROTHIAZIDE 20; 12.5 MG/1; MG/1
TABLET ORAL
Qty: 90 TAB | Refills: 3 | Status: SHIPPED | OUTPATIENT
Start: 2017-07-12 | End: 2018-07-17 | Stop reason: SDUPTHER

## 2017-08-04 ENCOUNTER — PATIENT OUTREACH (OUTPATIENT)
Dept: HEALTH INFORMATION MANAGEMENT | Facility: OTHER | Age: 77
End: 2017-08-04

## 2017-09-06 ENCOUNTER — TELEPHONE (OUTPATIENT)
Dept: MEDICAL GROUP | Facility: CLINIC | Age: 77
End: 2017-09-06

## 2017-09-06 NOTE — TELEPHONE ENCOUNTER
Future Appointments       Provider Department Center    9/7/2017 1:00 PM Angel Meraz D.O. [facility]          ESTABLISHED PATIENT PRE-VISIT PLANNING     Note: Patient will not be contacted if there is no indication to call. PT was not Contacted.    1.    Reviewed note from last office visit with PCP: YES Last office visit: 6/7/17    2.  If any orders were placed at last visit, do we have Results/Consult Notes?        •  Labs - Labs ordered, completed and results are in chart. [facility] ONLY  (CMP,CBC,TSH, FREE THYROXINE, NOT COMPLETED)       •  Imaging - Imaging was not ordered at last office visit.        •  Referrals - No referrals were ordered at last office visit.     3.  Immunizations were updated in Epic using WebIZ?: Epic matches WebIZ       •  Web Iz Recommendations:   Td (adult), adsorbed   Zoster (Shingles)   Influenza w/preserv.         4.  Patient is due for the following Health Maintenance Topics:   Health Maintenance Due   Topic Date Due   • IMM ZOSTER VACCINE  08/17/1999   • IMM INFLUENZA (1) 09/01/2017           5.  Patient was not informed to arrive 15 min prior to their scheduled appointment and bring in their medication bottles.

## 2017-09-07 ENCOUNTER — OFFICE VISIT (OUTPATIENT)
Dept: MEDICAL GROUP | Facility: CLINIC | Age: 77
End: 2017-09-07
Payer: MEDICARE

## 2017-09-07 VITALS
HEART RATE: 62 BPM | HEIGHT: 68 IN | WEIGHT: 148.5 LBS | RESPIRATION RATE: 18 BRPM | SYSTOLIC BLOOD PRESSURE: 136 MMHG | TEMPERATURE: 98.5 F | BODY MASS INDEX: 22.51 KG/M2 | OXYGEN SATURATION: 98 % | DIASTOLIC BLOOD PRESSURE: 72 MMHG

## 2017-09-07 DIAGNOSIS — Z23 NEED FOR INFLUENZA VACCINATION: ICD-10-CM

## 2017-09-07 DIAGNOSIS — M54.5 LOW BACK PAIN, UNSPECIFIED BACK PAIN LATERALITY, UNSPECIFIED CHRONICITY, WITH SCIATICA PRESENCE UNSPECIFIED: ICD-10-CM

## 2017-09-07 PROCEDURE — 90662 IIV NO PRSV INCREASED AG IM: CPT | Performed by: INTERNAL MEDICINE

## 2017-09-07 PROCEDURE — 99213 OFFICE O/P EST LOW 20 MIN: CPT | Mod: 25 | Performed by: INTERNAL MEDICINE

## 2017-09-07 PROCEDURE — G0008 ADMIN INFLUENZA VIRUS VAC: HCPCS | Performed by: INTERNAL MEDICINE

## 2017-09-07 RX ORDER — OXYCODONE AND ACETAMINOPHEN 10; 325 MG/1; MG/1
1 TABLET ORAL EVERY 6 HOURS PRN
Qty: 90 TAB | Refills: 0 | Status: SHIPPED | OUTPATIENT
Start: 2017-09-07 | End: 2017-09-07 | Stop reason: SDUPTHER

## 2017-09-07 RX ORDER — OXYCODONE AND ACETAMINOPHEN 10; 325 MG/1; MG/1
1 TABLET ORAL EVERY 6 HOURS PRN
Qty: 90 TAB | Refills: 0 | Status: SHIPPED | OUTPATIENT
Start: 2017-09-07 | End: 2017-12-07 | Stop reason: SDUPTHER

## 2017-09-08 NOTE — PROGRESS NOTES
CC: Brian Mcduffie is a 77 y.o. male is suffering from   Chief Complaint   Patient presents with   • Follow-Up         SUBJECTIVE:  1. Need for influenza vaccination  Given in the office     2. Low back pain, unspecified back pain laterality, unspecified chronicity, with sciatica presence unspecified  Treated with Percocet, is doing well with low-dose narcotic analgesics    Chronic pain recheck:   Last dose of controlled substance: Today  Chronic pain treated with Percocet taken 3 times a day    He  reports that he drinks alcohol.  He  reports that he does not use drugs.    Consequences of Chronic Opiate therapy:  (5 A's)  Analgesia: Compared to no treatment or prior treatment, pain is currently improved  Activity: improved  Adverse Events: He denies constipation, dry mouth, itchy skin, nausea, sedation and none  Aberrant Behaviors: He reports he is taking medication as prescribed and is not veering from agreed treatment regimen. There have been no inappropriate refills or lost/stolen meds reported.   Affect/Mood: Pain is not impacting patient's mood.  Patient denies depression/anxiety.    Nonnarcotic treatments that are being used: none.   Treatment goals discussed.    Last order of CONTROLLED SUBSTANCE TREATMENT AGREEMENT was found on 3/7/2017 from Office Visit on 3/7/2017     UDS Summary                URINE DRUG SCREEN Next Due 6/2/2018      [facility]     Patient has more history with this topic...        Most recent UDS reviewed today and is consistent with prescribed medications.   Opioid Risk Score: 0    Interpretation of Opioid Risk Score   Score 0-3 = Low risk of abuse. Do UDS at least once per year.  Score 4-7 = Moderate risk of abuse. Do UDS 1-4 times per year.  Score 8+ = High risk of abuse. Refer to specialist.      I have reviewed the medical records, the Prescription Monitoring Program and I have determined that controlled substance treatment is medically  indicated.      Past social, family, history:   Social History   Substance Use Topics   • Smoking status: Former Smoker     Quit date: 10/3/1969   • Smokeless tobacco: Never Used   • Alcohol use Yes      Comment: 2 a month         MEDICATIONS:    Current Outpatient Prescriptions:   •  oxycodone-acetaminophen (PERCOCET-10)  MG Tab, Take 1 Tab by mouth every 6 hours as needed., Disp: 90 Tab, Rfl: 0  •  lisinopril-hydrochlorothiazide (PRINZIDE, ZESTORETIC) 20-12.5 MG per tablet, TAKE ONE TABLET BY MOUTH DAILY, Disp: 90 Tab, Rfl: 3  •  allopurinol (ZYLOPRIM) 300 MG Tab, TAKE ONE TABLET BY MOUTH DAILY, Disp: 90 Tab, Rfl: 3  •  amlodipine (NORVASC) 2.5 MG Tab, TAKE ONE TABLET BY MOUTH DAILY, Disp: 90 Tab, Rfl: 3  •  tamsulosin (FLOMAX) 0.4 MG capsule, TAKE TWO CAPSULES BY MOUTH DAILY 1/2 AN HOUR AFTER BREAKFAST, Disp: 180 Cap, Rfl: 2  •  lovastatin (MEVACOR) 40 MG tablet, TAKE ONE TABLET BY MOUTH DAILY, Disp: 90 Tab, Rfl: 3  •  tamsulosin (FLOMAX) 0.4 MG capsule, Take 1 Cap by mouth ONE-HALF HOUR AFTER BREAKFAST., Disp: 90 Cap, Rfl: 3  •  ondansetron (ZOFRAN) 4 MG TABS tablet, Take 1 Tab by mouth every 8 hours as needed., Disp: 5 Tab, Rfl: 1    PROBLEMS:  Patient Active Problem List    Diagnosis Date Noted   • Chronic use of opiate drugs therapeutic purposes 02/14/2017   • Aortic calcification (CMS-HCC) 05/24/2013   • Colon polyp 02/19/2013   • Inguinal hernia 10/16/2012   • Elevated PSA 07/03/2012   • Nocturia 12/06/2010   • Skin texture changes 12/16/2009   • Nocturia 09/09/2009   • HTN (hypertension) 06/05/2009   • Gouty arthritis 05/22/2009   • Renal insufficiency 05/22/2009   • Dyslipidemia 05/22/2009   • CHF (congestive heart failure) (CMS-HCC) 05/22/2009   • HYPOTENSION 05/22/2009   • Neck pain 05/15/2009       REVIEW OF SYSTEMS:  Gen.:  No Nausea, Vomiting, fever, Chills.  Heart: No chest pain.  Lungs:  No shortness of Breath.  Psychological: Tony unusual Anxiety depression     PHYSICAL EXAM  "  Constitutional: Alert, cooperative, not in acute distress.  Cardiovascular:  Rate Rhythm is regular without murmurs rubs clicks.     Thorax & Lungs: Clear to auscultation, no wheezing, rhonchi, or rales  HENT: Normocephalic, Atraumatic.  Eyes: PERRLA, EOMI, Conjunctiva normal.   Neck: Trachia is midline no swelling of the thyroid.   Lymphatic: No lymphadenopathy noted.   Musculoskeletal: Continued complaints of low back pain and discomfort  Neurologic: Alert & oriented x 3, cranial nerves II through XII are intact, Normal motor function, Normal sensory function, No focal deficits noted.   Psychiatric: Affect normal, Judgment normal, Mood normal.     VITAL SIGNS:/72   Pulse 62   Temp 36.9 °C (98.5 °F)   Resp 18   Ht 1.727 m (5' 8\")   Wt 67.4 kg (148 lb 8 oz)   SpO2 98%   BMI 22.58 kg/m²     Labs: Reviewed    Assessment:                                                     Plan:    1. Need for influenza vaccination  Vaccination given  - INFLUENZA VACCINE, HIGH DOSE (65+ ONLY)    2. Low back pain, unspecified back pain laterality, unspecified chronicity, with sciatica presence unspecified  Continue Percocet  - oxycodone-acetaminophen (PERCOCET-10)  MG Tab; Take 1 Tab by mouth every 6 hours as needed.  Dispense: 90 Tab; Refill: 0        "

## 2017-11-16 RX ORDER — TAMSULOSIN HYDROCHLORIDE 0.4 MG/1
CAPSULE ORAL
Qty: 180 CAP | Refills: 3 | Status: SHIPPED | OUTPATIENT
Start: 2017-11-16 | End: 2019-01-09 | Stop reason: SDUPTHER

## 2017-11-20 RX ORDER — LOVASTATIN 40 MG/1
TABLET ORAL
Qty: 90 TAB | Refills: 3 | Status: SHIPPED | OUTPATIENT
Start: 2017-11-20 | End: 2019-04-09

## 2017-12-07 ENCOUNTER — OFFICE VISIT (OUTPATIENT)
Dept: MEDICAL GROUP | Facility: CLINIC | Age: 77
End: 2017-12-07
Payer: MEDICARE

## 2017-12-07 VITALS
WEIGHT: 142.9 LBS | TEMPERATURE: 99 F | HEART RATE: 86 BPM | HEIGHT: 68 IN | OXYGEN SATURATION: 98 % | SYSTOLIC BLOOD PRESSURE: 120 MMHG | BODY MASS INDEX: 21.66 KG/M2 | RESPIRATION RATE: 18 BRPM | DIASTOLIC BLOOD PRESSURE: 85 MMHG

## 2017-12-07 DIAGNOSIS — M54.5 LOW BACK PAIN, UNSPECIFIED BACK PAIN LATERALITY, UNSPECIFIED CHRONICITY, WITH SCIATICA PRESENCE UNSPECIFIED: ICD-10-CM

## 2017-12-07 PROCEDURE — 99213 OFFICE O/P EST LOW 20 MIN: CPT | Performed by: INTERNAL MEDICINE

## 2017-12-07 RX ORDER — OXYCODONE AND ACETAMINOPHEN 10; 325 MG/1; MG/1
1 TABLET ORAL EVERY 6 HOURS PRN
Qty: 90 TAB | Refills: 0 | Status: SHIPPED | OUTPATIENT
Start: 2017-12-07 | End: 2017-12-07 | Stop reason: SDUPTHER

## 2017-12-07 RX ORDER — OXYCODONE AND ACETAMINOPHEN 10; 325 MG/1; MG/1
1 TABLET ORAL EVERY 6 HOURS PRN
Qty: 120 TAB | Refills: 0 | Status: SHIPPED | OUTPATIENT
Start: 2017-12-07 | End: 2017-12-07 | Stop reason: SDUPTHER

## 2017-12-07 RX ORDER — OXYCODONE AND ACETAMINOPHEN 10; 325 MG/1; MG/1
1 TABLET ORAL EVERY 6 HOURS PRN
Qty: 90 TAB | Refills: 0 | Status: SHIPPED | OUTPATIENT
Start: 2017-12-07 | End: 2017-12-07

## 2017-12-07 RX ORDER — OXYCODONE AND ACETAMINOPHEN 10; 325 MG/1; MG/1
1 TABLET ORAL EVERY 6 HOURS PRN
Qty: 120 TAB | Refills: 0 | Status: SHIPPED | OUTPATIENT
Start: 2017-12-07 | End: 2018-02-26 | Stop reason: SDUPTHER

## 2017-12-07 RX ORDER — OXYCODONE AND ACETAMINOPHEN 10; 325 MG/1; MG/1
1 TABLET ORAL EVERY 6 HOURS PRN
Qty: 120 TAB | Refills: 0 | Status: SHIPPED | OUTPATIENT
Start: 2017-12-07

## 2017-12-08 NOTE — PROGRESS NOTES
CC: Brian Mcduffie is a 77 y.o. male is suffering from   Chief Complaint   Patient presents with   • Follow-Up         SUBJECTIVE:  1. Low back pain, unspecified back pain laterality, unspecified chronicity, with sciatica presence unspecified  Ga here for follow-up, continues to have severe pain with his back. We have reviewed his x-rays also his MRI. I have recommended that he see neurosurgery. Were having to increase his pain medication upwards.        Past social, family, history:   Social History   Substance Use Topics   • Smoking status: Former Smoker     Quit date: 10/3/1969   • Smokeless tobacco: Never Used   • Alcohol use Yes      Comment: 2 a month         MEDICATIONS:    Current Outpatient Prescriptions:   •  oxycodone-acetaminophen (PERCOCET-10)  MG Tab, Take 1 Tab by mouth every 6 hours as needed. ICD 10 M54.5, Disp: 120 Tab, Rfl: 0  •  oxycodone-acetaminophen (PERCOCET-10)  MG Tab, Take 1 Tab by mouth every 6 hours as needed. ICD 10 M54.5, Disp: 120 Tab, Rfl: 0  •  oxycodone-acetaminophen (PERCOCET-10)  MG Tab, Take 1 Tab by mouth every 6 hours as needed. ICD 10 M54.5, Disp: 120 Tab, Rfl: 0  •  lovastatin (MEVACOR) 40 MG tablet, TAKE ONE TABLET BY MOUTH DAILY, Disp: 90 Tab, Rfl: 3  •  tamsulosin (FLOMAX) 0.4 MG capsule, TAKE TWO CAPSULES BY MOUTH DAILY TAKE ONE-HALF AN HOUR AFTER BREAKFAST, Disp: 180 Cap, Rfl: 3  •  lisinopril-hydrochlorothiazide (PRINZIDE, ZESTORETIC) 20-12.5 MG per tablet, TAKE ONE TABLET BY MOUTH DAILY, Disp: 90 Tab, Rfl: 3  •  allopurinol (ZYLOPRIM) 300 MG Tab, TAKE ONE TABLET BY MOUTH DAILY, Disp: 90 Tab, Rfl: 3  •  amlodipine (NORVASC) 2.5 MG Tab, TAKE ONE TABLET BY MOUTH DAILY, Disp: 90 Tab, Rfl: 3  •  ondansetron (ZOFRAN) 4 MG TABS tablet, Take 1 Tab by mouth every 8 hours as needed., Disp: 5 Tab, Rfl: 1    PROBLEMS:  Patient Active Problem List    Diagnosis Date Noted   • Chronic use of opiate drugs therapeutic purposes 02/14/2017   • Aortic  "calcification (CMS-HCC) 05/24/2013   • Colon polyp 02/19/2013   • Inguinal hernia 10/16/2012   • Elevated PSA 07/03/2012   • Nocturia 12/06/2010   • Skin texture changes 12/16/2009   • Nocturia 09/09/2009   • HTN (hypertension) 06/05/2009   • Gouty arthritis 05/22/2009   • Renal insufficiency 05/22/2009   • Dyslipidemia 05/22/2009   • CHF (congestive heart failure) (CMS-HCC) 05/22/2009   • HYPOTENSION 05/22/2009   • Neck pain 05/15/2009       REVIEW OF SYSTEMS:  Gen.:  No Nausea, Vomiting, fever, Chills.  Heart: No chest pain.  Lungs:  No shortness of Breath.  Psychological: Tony unusual Anxiety depression     PHYSICAL EXAM   Constitutional: Alert, cooperative, not in acute distress.  Cardiovascular:  Rate Rhythm is regular without murmurs rubs clicks.     Thorax & Lungs: Clear to auscultation, no wheezing, rhonchi, or rales  HENT: Normocephalic, Atraumatic.  Eyes: PERRLA, EOMI, Conjunctiva normal.   Neck: Trachia is midline no swelling of the thyroid.   Lymphatic: No lymphadenopathy noted.   Neurologic: Alert & oriented x 3, cranial nerves II through XII are intact, Normal motor function, Normal sensory function, No focal deficits noted.   Psychiatric: Affect normal, Judgment normal, Mood normal.     VITAL SIGNS:/85   Pulse 86   Temp 37.2 °C (99 °F)   Resp 18   Ht 1.727 m (5' 8\")   Wt 64.8 kg (142 lb 14.4 oz)   SpO2 98%   BMI 21.73 kg/m²     Labs: Reviewed    Assessment:                                                     Plan:    1. Low back pain, unspecified back pain laterality, unspecified chronicity, with sciatica presence unspecified  Low back pain with multiple spinal listhesis. X-rays flexion-extension referral to neurosurgery increase pain medication from 3 a day up to 4 a day.  - REFERRAL TO NEUROSURGERY  - DX-LUMBAR SPINE-BEND OR FLEX-EXT; Future  - oxycodone-acetaminophen (PERCOCET-10)  MG Tab; Take 1 Tab by mouth every 6 hours as needed. ICD 10 M54.5  Dispense: 120 Tab; Refill: 0  - " oxycodone-acetaminophen (PERCOCET-10)  MG Tab; Take 1 Tab by mouth every 6 hours as needed. ICD 10 M54.5  Dispense: 120 Tab; Refill: 0  - oxycodone-acetaminophen (PERCOCET-10)  MG Tab; Take 1 Tab by mouth every 6 hours as needed. ICD 10 M54.5  Dispense: 120 Tab; Refill: 0

## 2018-01-29 RX ORDER — LOVASTATIN 40 MG/1
TABLET ORAL
Qty: 90 TAB | Refills: 3 | Status: SHIPPED | OUTPATIENT
Start: 2018-01-29 | End: 2019-04-09 | Stop reason: SDUPTHER

## 2018-02-08 ENCOUNTER — TELEPHONE (OUTPATIENT)
Dept: MEDICAL GROUP | Facility: CLINIC | Age: 78
End: 2018-02-08

## 2018-02-08 DIAGNOSIS — M54.5 LOW BACK PAIN, UNSPECIFIED BACK PAIN LATERALITY, UNSPECIFIED CHRONICITY, WITH SCIATICA PRESENCE UNSPECIFIED: ICD-10-CM

## 2018-02-08 NOTE — TELEPHONE ENCOUNTER
1. Caller Name: Brian Mcduffie                      Call Back Number: [phone] (home)     2. Message: pt want an MRI order for his lower back. Pt will call back with the name of the facility to be fax to. Thank you     3. Patient approves office to leave a detailed voicemail/MyChart message: yes

## 2018-02-21 ENCOUNTER — HOSPITAL ENCOUNTER (OUTPATIENT)
Dept: RADIOLOGY | Facility: MEDICAL CENTER | Age: 78
End: 2018-02-21
Attending: INTERNAL MEDICINE
Payer: MEDICARE

## 2018-02-21 DIAGNOSIS — M54.5 LOW BACK PAIN, UNSPECIFIED BACK PAIN LATERALITY, UNSPECIFIED CHRONICITY, WITH SCIATICA PRESENCE UNSPECIFIED: ICD-10-CM

## 2018-02-21 PROCEDURE — 72148 MRI LUMBAR SPINE W/O DYE: CPT

## 2018-02-26 ENCOUNTER — OFFICE VISIT (OUTPATIENT)
Dept: MEDICAL GROUP | Facility: CLINIC | Age: 78
End: 2018-02-26
Payer: MEDICARE

## 2018-02-26 VITALS
TEMPERATURE: 97.8 F | BODY MASS INDEX: 21.07 KG/M2 | WEIGHT: 139 LBS | HEART RATE: 70 BPM | HEIGHT: 68 IN | RESPIRATION RATE: 14 BRPM | OXYGEN SATURATION: 98 % | SYSTOLIC BLOOD PRESSURE: 130 MMHG | DIASTOLIC BLOOD PRESSURE: 76 MMHG

## 2018-02-26 DIAGNOSIS — G89.29 OTHER CHRONIC BACK PAIN: ICD-10-CM

## 2018-02-26 DIAGNOSIS — M54.5 LOW BACK PAIN, UNSPECIFIED BACK PAIN LATERALITY, UNSPECIFIED CHRONICITY, WITH SCIATICA PRESENCE UNSPECIFIED: ICD-10-CM

## 2018-02-26 DIAGNOSIS — M54.9 OTHER CHRONIC BACK PAIN: ICD-10-CM

## 2018-02-26 PROCEDURE — 99213 OFFICE O/P EST LOW 20 MIN: CPT | Performed by: INTERNAL MEDICINE

## 2018-02-26 RX ORDER — OXYCODONE AND ACETAMINOPHEN 10; 325 MG/1; MG/1
1-2 TABLET ORAL EVERY 6 HOURS PRN
Qty: 180 TAB | Refills: 0 | Status: SHIPPED | OUTPATIENT
Start: 2018-04-29 | End: 2018-05-29

## 2018-02-26 RX ORDER — OXYCODONE AND ACETAMINOPHEN 10; 325 MG/1; MG/1
1-2 TABLET ORAL EVERY 6 HOURS PRN
Qty: 180 TAB | Refills: 0 | Status: SHIPPED | OUTPATIENT
Start: 2018-02-26 | End: 2018-02-26 | Stop reason: SDUPTHER

## 2018-02-26 RX ORDER — OXYCODONE AND ACETAMINOPHEN 10; 325 MG/1; MG/1
1-2 TABLET ORAL EVERY 6 HOURS PRN
Qty: 180 TAB | Refills: 0 | Status: SHIPPED | OUTPATIENT
Start: 2018-03-29 | End: 2018-02-26 | Stop reason: SDUPTHER

## 2018-02-26 ASSESSMENT — PATIENT HEALTH QUESTIONNAIRE - PHQ9: CLINICAL INTERPRETATION OF PHQ2 SCORE: 0

## 2018-02-26 NOTE — PROGRESS NOTES
CC: Brian Mcduffie is a 77 y.o. male is suffering from   Chief Complaint   Patient presents with   • Follow-Up     MRI results          SUBJECTIVE:  1. Other chronic back pain  Koroma here for follow-up, status post MRI which shows severe foraminal stenosis, patient also has L3-4 moderate to severe central canal stenosis.     2. Low back pain, unspecified back pain laterality, unspecified chronicity, with sciatica presence unspecified  Patient and I have discussed he needs to be seen by neurosurgery, I do not feel that a referral to physical medicine rehabilitation would be appropriate!        Past social, family, history:   Social History   Substance Use Topics   • Smoking status: Former Smoker     Quit date: 10/3/1969   • Smokeless tobacco: Never Used   • Alcohol use Yes      Comment: 2 a month         MEDICATIONS:    Current Outpatient Prescriptions:   •  [START ON 4/29/2018] oxyCODONE-acetaminophen (PERCOCET-10)  MG Tab, Take 1-2 Tabs by mouth every 6 hours as needed for up to 30 days. ICD 10 M54.5, Disp: 180 Tab, Rfl: 0  •  lovastatin (MEVACOR) 40 MG tablet, TAKE ONE TABLET BY MOUTH DAILY, Disp: 90 Tab, Rfl: 3  •  lovastatin (MEVACOR) 40 MG tablet, TAKE ONE TABLET BY MOUTH DAILY, Disp: 90 Tab, Rfl: 3  •  tamsulosin (FLOMAX) 0.4 MG capsule, TAKE TWO CAPSULES BY MOUTH DAILY TAKE ONE-HALF AN HOUR AFTER BREAKFAST, Disp: 180 Cap, Rfl: 3  •  lisinopril-hydrochlorothiazide (PRINZIDE, ZESTORETIC) 20-12.5 MG per tablet, TAKE ONE TABLET BY MOUTH DAILY, Disp: 90 Tab, Rfl: 3  •  allopurinol (ZYLOPRIM) 300 MG Tab, TAKE ONE TABLET BY MOUTH DAILY, Disp: 90 Tab, Rfl: 3  •  oxycodone-acetaminophen (PERCOCET-10)  MG Tab, Take 1 Tab by mouth every 6 hours as needed. ICD 10 M54.5, Disp: 120 Tab, Rfl: 0  •  oxycodone-acetaminophen (PERCOCET-10)  MG Tab, Take 1 Tab by mouth every 6 hours as needed. ICD 10 M54.5, Disp: 120 Tab, Rfl: 0  •  amlodipine (NORVASC) 2.5 MG Tab, TAKE ONE TABLET BY MOUTH  "DAILY, Disp: 90 Tab, Rfl: 3  •  ondansetron (ZOFRAN) 4 MG TABS tablet, Take 1 Tab by mouth every 8 hours as needed., Disp: 5 Tab, Rfl: 1    PROBLEMS:  Patient Active Problem List    Diagnosis Date Noted   • Chronic use of opiate drugs therapeutic purposes 02/14/2017   • Aortic calcification (CMS-HCC) 05/24/2013   • Colon polyp 02/19/2013   • Inguinal hernia 10/16/2012   • Elevated PSA 07/03/2012   • Nocturia 12/06/2010   • Skin texture changes 12/16/2009   • Nocturia 09/09/2009   • HTN (hypertension) 06/05/2009   • Gouty arthritis 05/22/2009   • Renal insufficiency 05/22/2009   • Dyslipidemia 05/22/2009   • CHF (congestive heart failure) (CMS-HCC) 05/22/2009   • HYPOTENSION 05/22/2009   • Neck pain 05/15/2009       REVIEW OF SYSTEMS:  Gen.:  No Nausea, Vomiting, fever, Chills.  Heart: No chest pain.  Lungs:  No shortness of Breath.  Psychological: Tony unusual Anxiety depression     PHYSICAL EXAM   Constitutional: Alert, cooperative, not in acute distress.  Cardiovascular:  Rate Rhythm is regular without murmurs rubs clicks.     Thorax & Lungs: Clear to auscultation, no wheezing, rhonchi, or rales  HENT: Normocephalic, Atraumatic.  Eyes: PERRLA, EOMI, Conjunctiva normal.   Neck: Trachia is midline no swelling of the thyroid.   Musculoskeletal: Moderate to severe back pain with weakness associated with proximal thighs denies bowel or bladder dysfunction  Neurologic: Alert & oriented x 3, cranial nerves II through XII are intact, Normal motor function, Normal sensory function, No focal deficits noted.   Psychiatric: Affect normal, Judgment normal, Mood normal.     VITAL SIGNS:/76   Pulse 70   Temp 36.6 °C (97.8 °F)   Resp 14   Ht 1.727 m (5' 8\")   Wt 63 kg (139 lb)   SpO2 98%   BMI 21.13 kg/m²     Labs: Reviewed    Assessment:                                                     Plan:    1. Other chronic back pain  Referral written in neurosurgery  - REFERRAL TO NEUROSURGERY    2. Low back pain, " unspecified back pain laterality, unspecified chronicity, with sciatica presence unspecified  Continue Percocet  - CONSENT FOR OPIATE PRESCRIPTION  - oxyCODONE-acetaminophen (PERCOCET-10)  MG Tab; Take 1-2 Tabs by mouth every 6 hours as needed for up to 30 days. ICD 10 M54.5  Dispense: 180 Tab; Refill: 0

## 2018-03-26 ENCOUNTER — TELEPHONE (OUTPATIENT)
Dept: MEDICAL GROUP | Facility: CLINIC | Age: 78
End: 2018-03-26

## 2018-03-26 DIAGNOSIS — M54.5 LOW BACK PAIN, UNSPECIFIED BACK PAIN LATERALITY, UNSPECIFIED CHRONICITY, WITH SCIATICA PRESENCE UNSPECIFIED: ICD-10-CM

## 2018-03-26 NOTE — TELEPHONE ENCOUNTER
Patient called back was advised of 's message below. Pt verbalized understood. Thank you will wait 2 days to fill.

## 2018-03-26 NOTE — TELEPHONE ENCOUNTER
Telephone call to the patient, looked up the prescriptions, they were dated for February 26, 2018, which means a prescription should last until March 28. The first aid for new prescription should be March 29 which it is in his records.

## 2018-04-26 ENCOUNTER — OFFICE VISIT (OUTPATIENT)
Dept: MEDICAL GROUP | Facility: CLINIC | Age: 78
End: 2018-04-26
Payer: MEDICARE

## 2018-04-26 VITALS
SYSTOLIC BLOOD PRESSURE: 130 MMHG | HEIGHT: 68 IN | BODY MASS INDEX: 20 KG/M2 | OXYGEN SATURATION: 99 % | WEIGHT: 132 LBS | RESPIRATION RATE: 12 BRPM | TEMPERATURE: 98.8 F | DIASTOLIC BLOOD PRESSURE: 74 MMHG | HEART RATE: 71 BPM

## 2018-04-26 DIAGNOSIS — M54.5 LOW BACK PAIN, UNSPECIFIED BACK PAIN LATERALITY, UNSPECIFIED CHRONICITY, WITH SCIATICA PRESENCE UNSPECIFIED: ICD-10-CM

## 2018-04-26 PROCEDURE — 99213 OFFICE O/P EST LOW 20 MIN: CPT | Performed by: INTERNAL MEDICINE

## 2018-04-26 RX ORDER — OXYCODONE AND ACETAMINOPHEN 10; 325 MG/1; MG/1
1 TABLET ORAL
Qty: 180 TAB | Refills: 0 | Status: SHIPPED | OUTPATIENT
Start: 2018-05-30 | End: 2018-04-26 | Stop reason: SDUPTHER

## 2018-04-26 RX ORDER — OXYCODONE AND ACETAMINOPHEN 10; 325 MG/1; MG/1
1 TABLET ORAL
Qty: 180 TAB | Refills: 0 | Status: SHIPPED | OUTPATIENT
Start: 2018-07-01 | End: 2018-07-31

## 2018-04-27 NOTE — PROGRESS NOTES
CC: Brian Mcduffie is a 77 y.o. male is suffering from   Chief Complaint   Patient presents with   • Follow-Up     2 months, med refills         SUBJECTIVE:  1. Low back pain, unspecified back pain laterality, unspecified chronicity, with sciatica presence unspecified  NARCISO Koroma here for follow-up, continues to have problems of back pain discomfort. Of explained to the patient that his back is severely arthritic. Patient's MRI was reviewed with the patient. Patient declines surgical consultation        Past social, family, history:   Social History   Substance Use Topics   • Smoking status: Former Smoker     Quit date: 10/3/1969   • Smokeless tobacco: Never Used   • Alcohol use Yes      Comment: 2 a month         MEDICATIONS:    Current Outpatient Prescriptions:   •  [START ON 7/1/2018] oxyCODONE-acetaminophen (PERCOCET-10)  MG Tab, Take 1 Tab by mouth 6 Times a Day for 30 days., Disp: 180 Tab, Rfl: 0  •  oxycodone-acetaminophen (PERCOCET-10)  MG Tab, Take 1 Tab by mouth every 6 hours as needed. ICD 10 M54.5, Disp: 120 Tab, Rfl: 0  •  lovastatin (MEVACOR) 40 MG tablet, TAKE ONE TABLET BY MOUTH DAILY, Disp: 90 Tab, Rfl: 3  •  tamsulosin (FLOMAX) 0.4 MG capsule, TAKE TWO CAPSULES BY MOUTH DAILY TAKE ONE-HALF AN HOUR AFTER BREAKFAST, Disp: 180 Cap, Rfl: 3  •  lisinopril-hydrochlorothiazide (PRINZIDE, ZESTORETIC) 20-12.5 MG per tablet, TAKE ONE TABLET BY MOUTH DAILY, Disp: 90 Tab, Rfl: 3  •  allopurinol (ZYLOPRIM) 300 MG Tab, TAKE ONE TABLET BY MOUTH DAILY, Disp: 90 Tab, Rfl: 3  •  amlodipine (NORVASC) 2.5 MG Tab, TAKE ONE TABLET BY MOUTH DAILY, Disp: 90 Tab, Rfl: 3  •  [START ON 4/29/2018] oxyCODONE-acetaminophen (PERCOCET-10)  MG Tab, Take 1-2 Tabs by mouth every 6 hours as needed for up to 30 days. ICD 10 M54.5, Disp: 180 Tab, Rfl: 0  •  lovastatin (MEVACOR) 40 MG tablet, TAKE ONE TABLET BY MOUTH DAILY, Disp: 90 Tab, Rfl: 3  •  oxycodone-acetaminophen (PERCOCET-10)  MG Tab, Take  "1 Tab by mouth every 6 hours as needed. ICD 10 M54.5, Disp: 120 Tab, Rfl: 0  •  ondansetron (ZOFRAN) 4 MG TABS tablet, Take 1 Tab by mouth every 8 hours as needed., Disp: 5 Tab, Rfl: 1    PROBLEMS:  Patient Active Problem List    Diagnosis Date Noted   • Chronic use of opiate drugs therapeutic purposes 02/14/2017   • Aortic calcification (CMS-HCC) 05/24/2013   • Colon polyp 02/19/2013   • Inguinal hernia 10/16/2012   • Elevated PSA 07/03/2012   • Nocturia 12/06/2010   • Skin texture changes 12/16/2009   • Nocturia 09/09/2009   • HTN (hypertension) 06/05/2009   • Gouty arthritis 05/22/2009   • Renal insufficiency 05/22/2009   • Dyslipidemia 05/22/2009   • CHF (congestive heart failure) (CMS-HCC) 05/22/2009   • HYPOTENSION 05/22/2009   • Neck pain 05/15/2009       REVIEW OF SYSTEMS:  Gen.:  No Nausea, Vomiting, fever, Chills.  Heart: No chest pain.  Lungs:  No shortness of Breath.  Psychological: Tony unusual Anxiety depression     PHYSICAL EXAM   Constitutional: Alert, cooperative, not in acute distress.  Cardiovascular:  Rate Rhythm is regular without murmurs rubs clicks.     Thorax & Lungs: Clear to auscultation, no wheezing, rhonchi, or rales  HENT: Normocephalic, Atraumatic.  Eyes: PERRLA, EOMI, Conjunctiva normal.   Neck: Trachia is midline no swelling of the thyroid.   Lymphatic: No lymphadenopathy noted.   Neurologic: Alert & oriented x 3, cranial nerves II through XII are intact, Normal motor function, Normal sensory function, No focal deficits noted.   Psychiatric: Affect normal, Judgment normal, Mood normal.     VITAL SIGNS:/74   Pulse 71   Temp 37.1 °C (98.8 °F)   Resp 12   Ht 1.727 m (5' 8\")   Wt 59.9 kg (132 lb)   SpO2 99%   BMI 20.07 kg/m²     Labs: Reviewed    Assessment:                                                     Plan:    1. Low back pain, unspecified back pain laterality, unspecified chronicity, with sciatica presence unspecified  Urine drug screen reviewed state drug contract " reviewed signed drug contract.  - CONTROLLED SUBSTANCE TREATMENT AGREEMENT  - [facility] PAIN MANAGEMENT SCREEN; Future  - oxyCODONE-acetaminophen (PERCOCET-10)  MG Tab; Take 1 Tab by mouth 6 Times a Day for 30 days.  Dispense: 180 Tab; Refill: 0

## 2018-05-04 ENCOUNTER — TELEPHONE (OUTPATIENT)
Dept: MEDICAL GROUP | Facility: CLINIC | Age: 78
End: 2018-05-04

## 2018-05-04 DIAGNOSIS — R82.5 POSITIVE URINE DRUG SCREEN: ICD-10-CM

## 2018-05-04 NOTE — TELEPHONE ENCOUNTER
Telephone call to the patient, notified him that he was positive on his drug screen reviewed his previous drug screens are all positive for amphetamine. I've asked the patient is short next week so we can discuss what appears to be an issue associated with drug abuse.

## 2018-06-13 ENCOUNTER — TELEPHONE (OUTPATIENT)
Dept: MEDICAL GROUP | Facility: CLINIC | Age: 78
End: 2018-06-13

## 2018-06-14 NOTE — TELEPHONE ENCOUNTER
Patient has not responded to my telephone call regarding a positive urine drug screen for methamphetamine.  Patient will be discharged from the practice because of suspected drug abuse.  Registered letter sent today

## 2018-06-25 RX ORDER — ALLOPURINOL 300 MG/1
TABLET ORAL
Qty: 90 TAB | Refills: 0 | Status: SHIPPED | OUTPATIENT
Start: 2018-06-25 | End: 2018-09-22 | Stop reason: SDUPTHER

## 2018-06-25 RX ORDER — AMLODIPINE BESYLATE 2.5 MG/1
TABLET ORAL
Qty: 90 TAB | Refills: 0 | Status: SHIPPED | OUTPATIENT
Start: 2018-06-25 | End: 2018-09-22 | Stop reason: SDUPTHER

## 2018-07-10 DIAGNOSIS — M54.5 LOW BACK PAIN, UNSPECIFIED BACK PAIN LATERALITY, UNSPECIFIED CHRONICITY, WITH SCIATICA PRESENCE UNSPECIFIED: ICD-10-CM

## 2018-07-10 RX ORDER — OXYCODONE AND ACETAMINOPHEN 10; 325 MG/1; MG/1
1 TABLET ORAL EVERY 6 HOURS PRN
Qty: 120 TAB | Refills: 0 | OUTPATIENT
Start: 2018-07-10 | End: 2018-08-09

## 2018-07-10 NOTE — TELEPHONE ENCOUNTER
Pt states that he never got the letter discharging him from the practice.  He is asking for his oxycodone Rx.  Please let me know what to tell him.

## 2018-07-17 RX ORDER — LISINOPRIL AND HYDROCHLOROTHIAZIDE 20; 12.5 MG/1; MG/1
TABLET ORAL
Qty: 30 TAB | Refills: 0 | Status: SHIPPED | OUTPATIENT
Start: 2018-07-17 | End: 2018-07-17

## 2018-07-19 RX ORDER — TAMSULOSIN HYDROCHLORIDE 0.4 MG/1
0.4 CAPSULE ORAL DAILY
Qty: 60 CAP | Refills: 0 | OUTPATIENT
Start: 2018-07-19

## 2018-07-19 RX ORDER — LOVASTATIN 40 MG/1
TABLET ORAL
Qty: 30 TAB | Refills: 0 | OUTPATIENT
Start: 2018-07-19

## 2018-07-19 RX ORDER — ALLOPURINOL 300 MG/1
TABLET ORAL
Qty: 30 TAB | Refills: 0 | OUTPATIENT
Start: 2018-07-19

## 2018-07-19 RX ORDER — AMLODIPINE BESYLATE 2.5 MG/1
TABLET ORAL
Qty: 30 TAB | Refills: 0 | OUTPATIENT
Start: 2018-07-19

## 2018-07-19 NOTE — TELEPHONE ENCOUNTER
Was the patient seen in the last year in this department? Yes     Does patient have an active prescription for medications requested? Yes     Received Request Via: Patient     Pt's wife called this time asking for Ga oxycodone.  I told her that Brian cannot get that Rx from here.  She asked for his other meds to be sent in for 30 days.

## 2018-08-15 RX ORDER — LISINOPRIL AND HYDROCHLOROTHIAZIDE 20; 12.5 MG/1; MG/1
TABLET ORAL
Qty: 30 TAB | Refills: 11 | Status: SHIPPED | OUTPATIENT
Start: 2018-08-15

## 2019-01-09 RX ORDER — TAMSULOSIN HYDROCHLORIDE 0.4 MG/1
CAPSULE ORAL
Qty: 180 CAP | Refills: 2 | Status: SHIPPED | OUTPATIENT
Start: 2019-01-09

## 2020-09-28 ENCOUNTER — TELEPHONE (OUTPATIENT)
Dept: MEDICAL GROUP | Facility: LAB | Age: 80
End: 2020-09-28

## 2020-09-28 NOTE — TELEPHONE ENCOUNTER
We received a refill request from the patient's pharmacy. I called the patient and left a voicemail informing the patient that he needs to call [phone] to establish care with a new provider regarding all refills, he is no longer under the care of our practice.

## 2021-01-14 DIAGNOSIS — Z23 NEED FOR VACCINATION: ICD-10-CM
